# Patient Record
Sex: FEMALE | NOT HISPANIC OR LATINO | URBAN - METROPOLITAN AREA
[De-identification: names, ages, dates, MRNs, and addresses within clinical notes are randomized per-mention and may not be internally consistent; named-entity substitution may affect disease eponyms.]

---

## 2017-05-31 ENCOUNTER — IMPORTED ENCOUNTER (OUTPATIENT)
Dept: URBAN - METROPOLITAN AREA CLINIC 1 | Facility: CLINIC | Age: 27
End: 2017-05-31

## 2017-05-31 PROBLEM — H52.13: Noted: 2017-05-31

## 2017-05-31 PROCEDURE — 92014 COMPRE OPH EXAM EST PT 1/>: CPT

## 2017-05-31 PROCEDURE — 92015 DETERMINE REFRACTIVE STATE: CPT

## 2017-05-31 NOTE — PATIENT DISCUSSION
1. Myopia: Rx was given for correction if indicated and requested. 2.  GPC OD - ok to cont CTL wear OD. Recommend ATs BID OU 3. Allergic Conjunctivitis OU - may use OTC Zaditor BID OU PRN for itching 4. Congenital Cataract OS - ObserveReturn for an appointment in 1 year 40/cc with Dr. Lara Campbell.

## 2017-11-07 ENCOUNTER — OFFICE VISIT (OUTPATIENT)
Dept: ORTHOPEDIC SURGERY | Age: 27
End: 2017-11-07

## 2017-11-07 VITALS
WEIGHT: 155 LBS | OXYGEN SATURATION: 100 % | BODY MASS INDEX: 23.49 KG/M2 | RESPIRATION RATE: 16 BRPM | TEMPERATURE: 98.6 F | HEART RATE: 53 BPM | SYSTOLIC BLOOD PRESSURE: 138 MMHG | HEIGHT: 68 IN | DIASTOLIC BLOOD PRESSURE: 78 MMHG

## 2017-11-07 DIAGNOSIS — M25.562 LEFT KNEE PAIN, UNSPECIFIED CHRONICITY: ICD-10-CM

## 2017-11-07 DIAGNOSIS — S83.412A COMPLETE TEAR OF MCL OF KNEE, LEFT, INITIAL ENCOUNTER: ICD-10-CM

## 2017-11-07 DIAGNOSIS — S83.242A ACUTE MEDIAL MENISCUS TEAR, LEFT, INITIAL ENCOUNTER: ICD-10-CM

## 2017-11-07 DIAGNOSIS — S83.512A RUPTURE OF ANTERIOR CRUCIATE LIGAMENT OF LEFT KNEE, INITIAL ENCOUNTER: Primary | ICD-10-CM

## 2017-11-07 RX ORDER — MELOXICAM 15 MG/1
15 TABLET ORAL
Qty: 30 TAB | Refills: 0 | Status: CANCELLED | OUTPATIENT
Start: 2017-11-07

## 2017-11-07 RX ORDER — ETODOLAC 400 MG/1
400 TABLET, FILM COATED ORAL 2 TIMES DAILY WITH MEALS
Qty: 60 TAB | Refills: 0 | Status: SHIPPED | OUTPATIENT
Start: 2017-11-07 | End: 2019-11-05

## 2017-11-07 RX ORDER — HYDROCODONE BITARTRATE AND ACETAMINOPHEN 5; 325 MG/1; MG/1
1 TABLET ORAL
Qty: 40 TAB | Refills: 0 | Status: SHIPPED | OUTPATIENT
Start: 2017-11-07 | End: 2017-11-29

## 2017-11-07 NOTE — LETTER
NOTIFICATION RETURN TO WORK / SCHOOL 
 
11/7/2017 1:57 PM 
 
Ms. Lisa Parker [address] [address] To Whom It May Concern: 
 
Lisa Parker is currently under the care of [address] [place], [place] [address]antonio Aguilar. She will return to work on 11/8/2017 with the following restrictions: sedentary duty only. If there are questions or concerns please have the patient contact our office. Sincerely, Darnell Cornejo MD

## 2017-11-07 NOTE — PATIENT INSTRUCTIONS
Joint Pain: Care Instructions  Your Care Instructions    Many people have small aches and pains from overuse or injury to muscles and joints. Joint injuries often happen during sports or recreation, work tasks, or projects around the home. An overuse injury can happen when you put too much stress on a joint or when you do an activity that stresses the joint over and over, such as using the computer or rowing a boat. You can take action at home to help your muscles and joints get better. You should feel better in 1 to 2 weeks, but it can take 3 months or more to heal completely. Follow-up care is a key part of your treatment and safety. Be sure to make and go to all appointments, and call your doctor if you are having problems. It's also a good idea to know your test results and keep a list of the medicines you take. How can you care for yourself at home? · Do not put weight on the injured joint for at least a day or two. · For the first day or two after an injury, do not take hot showers or baths, and do not use hot packs. The heat could make swelling worse. · Put ice or a cold pack on the sore joint for 10 to 20 minutes at a time. Try to do this every 1 to 2 hours for the next 3 days (when you are awake) or until the swelling goes down. Put a thin cloth between the ice and your skin. · Wrap the injury in an elastic bandage. Do not wrap it too tightly because this can cause more swelling. · Prop up the sore joint on a pillow when you ice it or anytime you sit or lie down during the next 3 days. Try to keep it above the level of your heart. This will help reduce swelling. · Take an over-the-counter pain medicine, such as acetaminophen (Tylenol), ibuprofen (Advil, Motrin), or naproxen (Aleve). Read and follow all instructions on the label. · After 1 or 2 days of rest, begin moving the joint gently.  While the joint is still healing, you can begin to exercise using activities that do not strain or hurt the painful joint. When should you call for help? Call your doctor now or seek immediate medical care if:  ? · You have signs of infection, such as:  ¨ Increased pain, swelling, warmth, and redness. ¨ Red streaks leading from the joint. ¨ A fever. ? Watch closely for changes in your health, and be sure to contact your doctor if:  ? · Your movement or symptoms are not getting better after 1 to 2 weeks of home treatment. Where can you learn more? Go to http://andrea-agustin.info/. Enter P205 in the search box to learn more about \"Joint Pain: Care Instructions. \"  Current as of: March 21, 2017  Content Version: 11.4  © 4358-9062 [employer]. Care instructions adapted under license by [employer] (which disclaims liability or warranty for this information). If you have questions about a medical condition or this instruction, always ask your healthcare professional. [address] any warranty or liability for your use of this information.

## 2017-11-07 NOTE — PROGRESS NOTES
Ruma Trinidad  1990   Chief Complaint   Patient presents with    Knee Pain     Left        HISTORY OF PRESENT ILLNESS  Ruma Trinidad is a 33 y.o. female who presents today for evaluation of left knee pain. she rates her pain 2/10 today. Pain has been present since 10/4/17. She recalls she injured the knee while playing volleyball. Immediately felt pain but swelling began the next day. Patient describes the pain as aching, stabbing and throbbing that is Constant in nature. Symptoms are worse with straightening the knee, placing weight on it, rotation and is better with Rest. Associated symptoms include Swelling. Since problem started, it: is unchanged. Pain does not wake patient up at night. Has taken no medication for the problem. Ambulating with a crutch. She has been wearing a knee brace and has also brought a hinged knee brace with her. She is very active. Has tried following treatments: Injections:NO; Brace:NO; Therapy:NO; Cane/Crutch:YES       No Known Allergies     History reviewed. No pertinent past medical history. Social History     Social History    Marital status: UNKNOWN     Spouse name: N/A    Number of children: N/A    Years of education: N/A     Occupational History    Not on file. Social History Main Topics    Smoking status: Never Smoker    Smokeless tobacco: Never Used    Alcohol use Yes      Comment: socially    Drug use: No    Sexual activity: Not on file     Other Topics Concern    Not on file     Social History Narrative    No narrative on file      Past Surgical History:   Procedure Laterality Date    FOOT/TOES SURGERY PROC UNLISTED        History reviewed. No pertinent family history. Current Outpatient Prescriptions   Medication Sig    ibuprofen 100 mg tablet Take 100 mg by mouth every six (6) hours as needed for Pain.  HYDROcodone-acetaminophen (NORCO) 5-325 mg per tablet Take 1 Tab by mouth every six (6) hours as needed for Pain.  Max Daily Amount: 4 Tabs.  etodolac (LODINE) 400 mg tablet Take 400 mg by mouth two (2) times daily (with meals). No current facility-administered medications for this visit. REVIEW OF SYSTEM   Patient denies: Weight loss, Fever/Chills, HA, Visual changes, Fatigue, Chest pain, SOB, Abdominal pain, N/V/D/C, Blood in stool or urine, Edema. Pertinent positive as above in HPI. All others were negative    PHYSICAL EXAM:   Visit Vitals    /78    Pulse (!) 53    Temp 98.6 °F (37 °C) (Oral)    Resp 16    Ht 5' 8\" (1.727 m)    Wt 155 lb (70.3 kg)    SpO2 100%    BMI 23.57 kg/m2     The patient is a well-developed, well-nourished female   in no acute distress. The patient is alert and oriented times three. The patient is alert and oriented times three. Mood and affect are normal.  LYMPHATIC: lymph nodes are not enlarged and are within normal limits  SKIN: normal in color and non tender to palpation. There are no bruises or abrasions noted. NEUROLOGICAL: Motor sensory exam is within normal limits. Reflexes are equal bilaterally. There is normal sensation to pinprick and light touch  MUSCULOSKELETAL:  Examination Left knee   Skin Intact   Range of motion 30-80   Effusion +   Medial joint line tenderness +   Lateral joint line tenderness -   Tenderness Pes Bursa -   Tenderness insertion MCL +   Tenderness insertion LCL -   Jias -   Patella crepitus -   Patella grind -   Lachman +   Pivot shift +   Anterior drawer +   Posterior drawer -   Varus stress -   Valgus stress -   Neurovascular Intact   Calf Swelling and Tenderness to Palpation -   Rob's Test -   Hamstring Cord Tightness -        IMAGING:   XR of the left knee dated 11/7/17 was reviewed and read: normal      IMPRESSION:      ICD-10-CM ICD-9-CM    1. Rupture of anterior cruciate ligament of left knee, initial encounter S83.512A 844.2    2. Left knee pain, unspecified chronicity M25.562 719.46 AMB POC XRAY, KNEE; 1/2 VIEWS   3.  Acute medial meniscus tear, left, initial encounter S83.242A 836.0 AMB SUPPLY ORDER      HYDROcodone-acetaminophen (NORCO) 5-325 mg per tablet      MRI KNEE LT WO CONT      etodolac (LODINE) 400 mg tablet      REFERRAL TO PHYSICAL THERAPY   4. Complete tear of MCL of knee, left, initial encounter S83.412A 844.1         PLAN:  1. Concern for internal derangement of the left knee. Ice and elevate the knee. Work on gentle ROM. Continue to use the crutch, weightbearing as tolerated. Note provided to return to work sitting work only. She will wear the hinged knee brace that she brought with her. Risk factors include: n/a  2. No cortisone injection indicated today   3. No Physical/Occupational Therapy indicated today  4. Yes diagnostic test indicated today: MRI LEFT KNEE  5. Yes durable medical equipment indicated today: CRUTCHES  6. No referral indicated today   7. Yes medications indicated today: LODINE 200 MG BID, NORCO 5  8. No Narcotic indicated today. Patient given pain medication for short term acute pain relief. Goal is to treat patient according to above plan and to ultimately have patient off all pain medications once appropriate. If chronic pain management is required beyond what is expected for current orthopedic problem, will refer patient to pain management.  was reviewed and will be reviewed with every medication refill request.        RTC following completion of the MRI  Follow-up Disposition: Not on File    Scribed by Gaston Smith [address]) as dictated by Karl Cardenas MD    I, Dr. Karl Cardenas, confirm that all documentation is accurate.     Karl Cardenas M.D.   [facility]

## 2017-11-14 ENCOUNTER — OFFICE VISIT (OUTPATIENT)
Dept: ORTHOPEDIC SURGERY | Age: 27
End: 2017-11-14

## 2017-11-14 ENCOUNTER — HOSPITAL ENCOUNTER (OUTPATIENT)
Dept: PHYSICAL THERAPY | Age: 27
Discharge: HOME | End: 2017-11-14
Payer: OTHER GOVERNMENT

## 2017-11-14 VITALS
OXYGEN SATURATION: 100 % | DIASTOLIC BLOOD PRESSURE: 75 MMHG | SYSTOLIC BLOOD PRESSURE: 125 MMHG | HEIGHT: 68 IN | RESPIRATION RATE: 16 BRPM | HEART RATE: 58 BPM

## 2017-11-14 DIAGNOSIS — S83.512A RUPTURE OF ANTERIOR CRUCIATE LIGAMENT OF LEFT KNEE, INITIAL ENCOUNTER: Primary | ICD-10-CM

## 2017-11-14 PROCEDURE — 97161 PT EVAL LOW COMPLEX 20 MIN: CPT | Performed by: PHYSICAL THERAPIST

## 2017-11-14 RX ORDER — OXYCODONE AND ACETAMINOPHEN 5; 325 MG/1; MG/1
1-2 TABLET ORAL
Qty: 60 TAB | Refills: 0 | Status: SHIPPED | OUTPATIENT
Start: 2017-11-14 | End: 2019-11-05

## 2017-11-14 NOTE — PROGRESS NOTES
HISTORY AND PHYSICAL          Patient: Bruce Robbins                MRN: [id number]       SSN: [id number]  YOB: 1990          AGE: 33 y.o. SEX: female      Patient scheduled for:  Left Knee Arthroscopic Anterior Cruciate Ligament Reconstruction   Surgeon: Aileen Aguilar MD    ANESTHESIA TYPE:  General    HISTORY:     The patient was seen in the office today for a preoperative history and physical for an upcoming above listed surgery. The patient is a pleasant 33 y.o. female who has a history of left knee pain and MRI results. Initial injury 10/4/17 while playing volleyball. She rates her pain 1/10 today. She states she has been working on her mobility and is now able to straighten the knee. Associated symptoms include instability and swelling. Is still utilizing crutches and knee immobilizer. Due to the current findings, affected activity of daily living and continued pain and discomfort, surgical intervention is indicated. The alternatives, risks, and complications, including but not limited to infection, blood loss, need for blood transfusion, neurovascular damage, cristo-incisional numbness, subcutaneous hematoma, bone fracture, anesthetic complications, DVT, PE, death, RSD, postoperative stiffness and pain, possible surgical scar, delayed healing and nonhealing, reflexive sympathetic dystrophy, damage to blood vessels and nerves, need for more surgery, MI, and stroke,  failure of hardware, gait disturbances,have been discussed. The patient understands and wishes to proceed with surgery. PAST MEDICAL HISTORY:     No past medical history on file. CURRENT MEDICATIONS:     Current Outpatient Prescriptions   Medication Sig Dispense Refill    oxyCODONE-acetaminophen (PERCOCET) 5-325 mg per tablet Take 1-2 Tabs by mouth every four (4) hours as needed for Pain. Max Daily Amount: 12 Tabs.  Do not take until after surgery 60 Tab 0    ibuprofen 100 mg tablet Take 100 mg by mouth every six (6) hours as needed for Pain.  HYDROcodone-acetaminophen (NORCO) 5-325 mg per tablet Take 1 Tab by mouth every six (6) hours as needed for Pain. Max Daily Amount: 4 Tabs. 40 Tab 0    etodolac (LODINE) 400 mg tablet Take 400 mg by mouth two (2) times daily (with meals). 60 Tab 0       ALLERGIES:     No Known Allergies      SURGICAL HISTORY:     Past Surgical History:   Procedure Laterality Date    FOOT/TOES SURGERY PROC UNLISTED         SOCIAL HISTORY:     Social History     Social History    Marital status:      Spouse name: N/A    Number of children: N/A    Years of education: N/A     Social History Main Topics    Smoking status: Never Smoker    Smokeless tobacco: Never Used    Alcohol use Yes      Comment: socially    Drug use: No    Sexual activity: Not on file     Other Topics Concern    Not on file     Social History Narrative    No narrative on file       FAMILY HISTORY:     No family history on file. REVIEW OF SYSTEMS:     Negative for fevers, chills, chest pain, shortness of breath, weight loss, recent illness     General: Negative for fever and chills. No unexpected change in weight. Denies fatigue. No change in appetite. Skin: Negative for rash or itching. HEENT: Negative for congestion, sore throat, neck pain and neck stiffness. No change in vision or hearing. Hasn't noted any enlarged lymph nodes in the neck. Cardiovascular:  Negative for chest pain and palpitations. Has not noted pedal edema. Respiratory: Negative for cough, colds, sinus, hemoptysis, shortness of breath and wheezing. Gastrointestinal: Negative for nausea and vomiting, rectal bleeding, coffee ground emesis, abdominal pain, diarrhea and constipation. Genitourinary: Negative for dysuria, frequency urgency, or burning on micturition. No flank pain, no foul smelling urine, no difficulty with initiating urination. Hematological: Negative for bleeding or easy bruising.   Musculoskeletal: Negative  for arthralgias, back pain or neck pain. Neurological: Negative for dizziness, seizures or syncopal episodes. Denies headaches. Endocrine: Denies excessive thirst.  No heat/cold intolerance. Psychiatric: Negative for depression or insomnia. PHYSICAL EXAMINATION:     VITALS:   Visit Vitals    /75    Pulse (!) 58    Resp 16    Ht 5' 8\" (1.727 m)    SpO2 100%     GEN:  Well developed, well nourished 33 y.o. female in no acute distress. HEENT: Normocephalic and atraumatic. Eyes: Conjunctivae and EOM are normal.Pupils are equal, round, and reactive to light. External ear normal appearance, external nose normal appearing. Mouth/Throat: Oropharynx is clear and moist, able to handle oral secretions w/out difficulty, airway patent  NECK: Supple. Normal ROM, No lymphadenopathy. Trachea is midline. No bruising, swelling or deformity  RESP: Clear to auscultation bilaterally. No wheezes, rales, rhonchi. Normal effort and breath sounds. No respiratory distress  CARDIO: Normal rate, regular rhythm and normal heart sounds. No MGR. ABDOMEN: Soft, non-tender, non-distended, normoactive bowel sounds in all four quadrants. There is no tenderness. There is no rebound and no guarding.    BACK: No CVA or spinal tenderness  BREAST:  Deferred  PELVIC:    Deferred   RECTAL:  Deferred   :           Deferred  EXTREMITIES: EXAMINATION OF: left knee  Examination Left knee   Skin Intact   Range of motion 0-110   Effusion +   Medial joint line tenderness +   Lateral joint line tenderness -   Tenderness Pes Bursa -   Tenderness insertion MCL +   Tenderness insertion LCL -   Jias -   Patella crepitus -   Patella grind -   Lachman +   Pivot shift +   Anterior drawer +   Posterior drawer -   Varus stress -   Valgus stress -   Neurovascular Intact   Calf Swelling and Tenderness to Palpation -   Rob's Test -   Hamstring Cord Tightness -       NEUROVASCULAR: Sensation intact to light touch and strength grossly intact and symmetrical. No nystagmus. Positive distal pulses and capillary refill. DVT ASSESSMENT:  There is not  calf tenderness. No evidence of DVT seen on physical exam.  MOTOR: In tact  PSYCH: Alert an oriented to person, place and time. Mood, memory, affect, behavior and judgment normal       RADIOGRAPHS & DIAGNOSTIC STUDIES:     MRI/xray reveals :   MRI of the left knee dated 11/9/17 was reviewed and read:   IMPRESSION:  1. Complete anterior cruciate ligament tear with associated small minimally depressed subchondral impaction fracture involving the lateral femoral condyle, small non depressed posterior medial tibial plateau impaction fracture, a small posterior lateral tibial plateau bone contusion. 2. Grade 1 MCL sprain and mild medial meniscal capsular junction injury. No evidence of meniscus tear. 3. Minor articular cartilage degeneration of the labral tibial plateau. 4. Large joint effusion.     XR of the left knee dated 11/7/17 was reviewed and read: normal         ASSESSMENT:       Encounter Diagnosis   Name Primary?  Rupture of anterior cruciate ligament of left knee, initial encounter Yes       PLAN:     Again, the alternatives, risks, and complications, as well as expected outcome were discussed. The patient understands and agrees to proceed with Left Knee Arthroscopic Anterior Cruciate Ligament Reconstruction .  Patient given orders listed below:    Orders Placed This Encounter    oxyCODONE-acetaminophen (PERCOCET) 5-325 mg per tablet         Jhon Walton PA-C  11/14/2017  6:11 PM

## 2017-11-15 NOTE — PROGRESS NOTES
[address] [place] [address], [facility] - Phone: [phone]  Fax: [phone] [facility]ARE / [address] [facility]  Patient Name: Naty Watkins : 1990   Medical   Diagnosis: L ACL tear, Grade I MCL sprain Treatment Diagnosis: Left knee pain [M25.562]   Onset Date: 10/4/2017     Referral Source: [facility]): 11/15/2017   Prior Hospitalization: See medical history Provider #: [id number]   Prior Level of Function: Active and participating with ease in ADLs   Comorbidities: na   Medications: Verified on Patient Summary List   The Plan of Care and following information is based on the information from the initial evaluation.   ===========================================================================================  Assessment / key information:  Patient is a 32 y/o female presenting with subjective complaints of L knee pain, swelling, decreased proprioception, and instability that occurred while playing volleyball on 10/4/2017. Since sx onset, pt has been using crutches and an immobilizer for ambulation. On 2017, she received an MRI that revealed a complete ACL tear, grade I MCL sprain, and small subchondral impaction fracture of the lateral femoral condyle. Patient is scheduled for an ACL repair, hamstring graft on 2017. Currently, the patient is not experiencing pain. Current limitations:  unable to play volleyball, ambulate w/o an AD, complete ADLs. Objective findings:  L knee edema (joint line circumference 14.1 inches), L knee AROM/PROM -6-70 deg/-3-89 deg, L hip MMT 5/5 throughout. L quad set: fair, unable to test other LLE strength secondary to pain.  Pt instructed in HEP and advised to f/u in clinic for pre-op PT.  ===========================================================================================  Eval Complexity: History LOW Complexity : Zero comorbidities / personal factors that will impact the outcome / POC;  Examination  MEDIUM Complexity : 3 Standardized tests and measures addressing body structure, function, activity limitation and / or participation in recreation ; Presentation LOW Complexity : Stable, uncomplicated ;  Decision Making MEDIUM Complexity : FOTO score of 26-74; Overall Complexity LOW   Problem List: pain affecting function, decrease ROM, decrease strength, edema affecting function, impaired gait/ balance, decrease ADL/ functional abilitiies, decrease activity tolerance, decrease flexibility/ joint mobility and decrease transfer abilities   Treatment Plan may include any combination of the following: Therapeutic exercise, Neuromuscular re-education, Physical agent/modality, Gait/balance training, Manual therapy, Patient education, Functional mobility training and Stair training  Patient / Family readiness to learn indicated by: asking questions, trying to perform skills and interest  Persons(s) to be included in education: patient (P)  Barriers to Learning/Limitations: None  Measures taken: L knee AROM/PROM: -6-70/-3-89; joint line girth 14.1 inches   Patient Goal (s): To return to volleyball   Patient self reported health status: excellent  Rehabilitation Potential: excellent   Short Term Goals: To be accomplished in  2  weeks:  1. Patient will be independent with HEP and activity modifications. 2. Patient will be able to perform a LLE quad set in order to isolate quadriceps prior to surgery  3. Patient will be able to flex knee to 100 degrees in order to gain functional pre-op range.  Long Term Goals: To be accomplished in  4  weeks:  1. Patient will re re-evaluated post surgery. New goals will be adopted at that time.   Frequency / Duration:   Patient to be seen  1  times per week for 2  weeks:  Patient / Caregiver education and instruction: self care, activity modification, brace/ splint application and exercises  G-Codes (GP): na  Therapist Signature: ALFRED Buenrostro, DPT, CMT Date: 11/55/3552   Certification Period: na Time: 7:52 AM   ===========================================================================================  I certify that the above Physical Therapy Services are being furnished while the patient is under my care. I agree with the treatment plan and certify that this therapy is necessary. Physician Signature:        Date:       Time:     Please sign and return to [facility]. or you may fax the signed copy to [phone]. Thank you.

## 2017-11-15 NOTE — PROGRESS NOTES
PHYSICAL THERAPY - DAILY TREATMENT NOTE    Patient Name: Kevin Pang        Date: 11/15/2017  : 1990   yes Patient  Verified  Visit #:   1   of   3  Insurance: Payor:  / Plan: Ghassan Brush DEPENDENTS / Product Type:  /      In time: 6:08 Out time: 6:50   Total Treatment Time: 40     Medicare Time Tracking (below)   Total Timed Codes (min):  na 1:1 Treatment Time:  na     TREATMENT AREA =  Left knee pain [M25.562]    SUBJECTIVE  Pain Level (on 0 to 10 scale):  0  / 10   Medication Changes/New allergies or changes in medical history, any new surgeries or procedures? yes  If yes, update Summary List   Subjective Functional Status/Changes:  []  No changes reported     See IE          OBJECTIVE  10 min Therapeutic Exercise:  [x]  See flow sheet   Rationale:      increase ROM, increase strength and increase proprioception to improve the patients ability to perform ADLs      min Patient Education:  yes  Reviewed HEP   []  Progressed/Changed HEP based on: Other Objective/Functional Measures:    Pt arrived at session with immobilizer locked at 0-85 deg on L side and 0-120 deg on R side. PT adjusted immobilizer to lock out at 0-0 to help prevent knee buckling due to decrease L quad strength. No gait training perofrmed as pt did not bring in her crutches; informed pt of intention to gait train next session. Post Treatment Pain Level (on 0 to 10) scale:   0  / 10     ASSESSMENT  Assessment/Changes in Function:     See IE     []  See Progress Note/Recertification   Patient will continue to benefit from skilled PT services to modify and progress therapeutic interventions, address functional mobility deficits, address ROM deficits, address strength deficits, analyze and address soft tissue restrictions, analyze and cue movement patterns, analyze and modify body mechanics/ergonomics and instruct in home and community integration to attain remaining goals.    Progress toward goals / Updated goals:    See IE     PLAN  []  Upgrade activities as tolerated yes Continue plan of care   []  Discharge due to :    []  Other:      Therapist: Mervat Chappell    Date: 11/15/2017 Time: 8:56 AM     No future appointments.

## 2017-11-16 DIAGNOSIS — S83.512D SPRAIN OF ANTERIOR CRUCIATE LIGAMENT OF LEFT KNEE, SUBSEQUENT ENCOUNTER: Primary | ICD-10-CM

## 2017-11-17 DIAGNOSIS — S83.242A ACUTE MEDIAL MENISCUS TEAR, LEFT, INITIAL ENCOUNTER: ICD-10-CM

## 2017-11-21 ENCOUNTER — OFFICE VISIT (OUTPATIENT)
Dept: ORTHOPEDIC SURGERY | Age: 27
End: 2017-11-21

## 2017-11-21 VITALS
HEART RATE: 59 BPM | DIASTOLIC BLOOD PRESSURE: 62 MMHG | HEIGHT: 69 IN | RESPIRATION RATE: 16 BRPM | BODY MASS INDEX: 22.15 KG/M2 | SYSTOLIC BLOOD PRESSURE: 130 MMHG | OXYGEN SATURATION: 100 %

## 2017-11-21 DIAGNOSIS — S83.512A RUPTURE OF ANTERIOR CRUCIATE LIGAMENT OF LEFT KNEE, INITIAL ENCOUNTER: Primary | ICD-10-CM

## 2017-11-21 RX ORDER — TRIAMCINOLONE ACETONIDE 40 MG/ML
40 INJECTION, SUSPENSION INTRA-ARTICULAR; INTRAMUSCULAR ONCE
Qty: 1 ML | Refills: 0
Start: 2017-11-21 | End: 2017-11-21

## 2017-11-21 NOTE — PATIENT INSTRUCTIONS
Anterior Cruciate Ligament (ACL) Tear: Care Instructions  Your Care Instructions    The anterior cruciate ligament (ACL) is one of four knee ligaments that connect the upper leg bone with the lower leg bones. The ACL keeps your knee stable when your leg moves forward and backward. It also keeps the knee from bending sideways. A torn ACL often occurs during sports that require stop-and-go or twisting motions, such as soccer, football, basketball, and snow skiing. Treatment depends on how badly the ACL is torn. Not all injuries need surgery. Your doctor also will base your treatment on how unstable the knee is, whether other parts of your knee are injured, and how active you are. You may be able to wait for a while before deciding whether to have surgery. Follow-up care is a key part of your treatment and safety. Be sure to make and go to all appointments, and call your doctor if you are having problems. It's also a good idea to know your test results and keep a list of the medicines you take. How can you care for yourself at home? · Follow your doctor's directions for wearing a brace or an immobilizer, which limits movement of your knee. Wrapping your knee with an elastic bandage may help reduce or prevent swelling. · Use crutches as directed in the first few days after your injury if your doctor recommends them. · Rest your knee when possible. But try to flex your calf muscles often to help blood circulate in your legs. · Put ice or a cold pack on your knee for 10 to 20 minutes at a time. Try to do this every 1 to 2 hours during the next 3 days (when you are awake) or until the swelling goes down. Put a thin cloth between the ice and your skin. · Prop up your leg on a pillow when you ice it or anytime you sit or lie down during the next 3 days. Try to keep it above the level of your heart. This will help reduce swelling. · Take pain medicines exactly as directed.   ¨ If the doctor gave you a prescription medicine for pain, take it as prescribed. ¨ If you are not taking a prescription pain medicine, take an over-the-counter medicine to reduce pain and swelling. These include ibuprofen (Advil, Motrin) and naproxen (Aleve). Read and follow all instructions on the label. · Follow your doctor's or physical therapist's directions for strength exercises. Exercises to make your thigh muscles stronger and increase knee motion can help you get ready for a physical rehabilitation (rehab) program or surgery with a rehab program. Here are a few exercises you can do if your doctor says it is okay. ¨ Quad sets: Lie down on the floor or the bed with your injured leg straight. Fully extend your leg-there should be no or little bend in your knee. Tighten the thigh (quadriceps) of your injured leg for 10 seconds. Do not lift your heel up. Relax your quadriceps for 10 seconds. Repeat 8 to 12 times several times during the day. ¨ Straight-leg raises: Lie down on the floor or the bed with your injured leg flat and your uninjured leg bent so that the bottom of your foot is on the floor or bed. Tighten the quadriceps of your injured leg. Keeping your knee as straight as possible, lift your injured leg off the bed until it is about 18 inches above the bed or floor. Lower your leg back down and relax for 5 seconds. Do 3 sets of 8 to 12 repetitions. ¨ Heel slides: Lie down on the floor or the bed with your leg flat. Slowly begin to slide your heel toward your rear end (buttocks), keeping your heel on the floor. Your knee will begin to bend. Slide your heel and bend your knee until it becomes a little sore and you can feel a small amount of pressure inside your knee. Hold this position for 15 to 30 seconds. Slide your heel back down until your leg is straight on the floor. Relax for 10 seconds. Repeat 2 to 4 times several times during the day.   ¨ Side-lying leg lifts: Lie on your side with your legs straight and the injured leg on top. Keeping your leg straight, raise your injured leg up and backward about 6 inches. Lower the leg to the starting position. Do 3 sets of 20 repetitions, or if you tire quickly, 3 sets of 8 to 12 repetitions. When should you call for help? Call 911 anytime you think you may need emergency care. For example, call if:  ? · You have chest pain, are short of breath, or you cough up blood. ?Call your doctor now or seek immediate medical care if:  ? · You have new or worse pain. ? · Your foot is cool or pale or changes color. ? · You have tingling, weakness, or numbness in your toes. ? · Your cast or splint feels too tight. ? · You have signs of a blood clot in your leg (called a deep vein thrombosis), such as:  ¨ Pain in your calf, back of the knee, thigh, or groin. ¨ Redness or swelling in your leg. ? Watch closely for changes in your health, and be sure to contact your doctor if:  ? · You have a problem with your splint or cast.   ? · You do not get better as expected. Where can you learn more? Go to http://andrea-agustin.info/. Melissa Murcia in the search box to learn more about \"Anterior Cruciate Ligament (ACL) Tear: Care Instructions. \"  Current as of: March 21, 2017  Content Version: 11.4  © 2701-8344 [employer]. Care instructions adapted under license by [employer] (which disclaims liability or warranty for this information). If you have questions about a medical condition or this instruction, always ask your healthcare professional. [place] [address] any warranty or liability for your use of this information. Anterior Cruciate Ligament (ACL) Tear: Care Instructions  Your Care Instructions    The anterior cruciate ligament (ACL) is one of four knee ligaments that connect the upper leg bone with the lower leg bones. The ACL keeps your knee stable when your leg moves forward and backward.  It also keeps the knee from bending sideways. A torn ACL often occurs during sports that require stop-and-go or twisting motions, such as soccer, football, basketball, and snow skiing. Treatment depends on how badly the ACL is torn. Not all injuries need surgery. Your doctor also will base your treatment on how unstable the knee is, whether other parts of your knee are injured, and how active you are. You may be able to wait for a while before deciding whether to have surgery. Follow-up care is a key part of your treatment and safety. Be sure to make and go to all appointments, and call your doctor if you are having problems. It's also a good idea to know your test results and keep a list of the medicines you take. How can you care for yourself at home? · Follow your doctor's directions for wearing a brace or an immobilizer, which limits movement of your knee. Wrapping your knee with an elastic bandage may help reduce or prevent swelling. · Use crutches as directed in the first few days after your injury if your doctor recommends them. · Rest your knee when possible. But try to flex your calf muscles often to help blood circulate in your legs. · Put ice or a cold pack on your knee for 10 to 20 minutes at a time. Try to do this every 1 to 2 hours during the next 3 days (when you are awake) or until the swelling goes down. Put a thin cloth between the ice and your skin. · Prop up your leg on a pillow when you ice it or anytime you sit or lie down during the next 3 days. Try to keep it above the level of your heart. This will help reduce swelling. · Take pain medicines exactly as directed. ¨ If the doctor gave you a prescription medicine for pain, take it as prescribed. ¨ If you are not taking a prescription pain medicine, take an over-the-counter medicine to reduce pain and swelling. These include ibuprofen (Advil, Motrin) and naproxen (Aleve). Read and follow all instructions on the label.   · Follow your doctor's or physical therapist's directions for strength exercises. Exercises to make your thigh muscles stronger and increase knee motion can help you get ready for a physical rehabilitation (rehab) program or surgery with a rehab program. Here are a few exercises you can do if your doctor says it is okay. ¨ Quad sets: Lie down on the floor or the bed with your injured leg straight. Fully extend your leg-there should be no or little bend in your knee. Tighten the thigh (quadriceps) of your injured leg for 10 seconds. Do not lift your heel up. Relax your quadriceps for 10 seconds. Repeat 8 to 12 times several times during the day. ¨ Straight-leg raises: Lie down on the floor or the bed with your injured leg flat and your uninjured leg bent so that the bottom of your foot is on the floor or bed. Tighten the quadriceps of your injured leg. Keeping your knee as straight as possible, lift your injured leg off the bed until it is about 18 inches above the bed or floor. Lower your leg back down and relax for 5 seconds. Do 3 sets of 8 to 12 repetitions. ¨ Heel slides: Lie down on the floor or the bed with your leg flat. Slowly begin to slide your heel toward your rear end (buttocks), keeping your heel on the floor. Your knee will begin to bend. Slide your heel and bend your knee until it becomes a little sore and you can feel a small amount of pressure inside your knee. Hold this position for 15 to 30 seconds. Slide your heel back down until your leg is straight on the floor. Relax for 10 seconds. Repeat 2 to 4 times several times during the day. ¨ Side-lying leg lifts: Lie on your side with your legs straight and the injured leg on top. Keeping your leg straight, raise your injured leg up and backward about 6 inches. Lower the leg to the starting position. Do 3 sets of 20 repetitions, or if you tire quickly, 3 sets of 8 to 12 repetitions. When should you call for help? Call 911 anytime you think you may need emergency care.  For example, call if:  ? · You have chest pain, are short of breath, or you cough up blood. ?Call your doctor now or seek immediate medical care if:  ? · You have new or worse pain. ? · Your foot is cool or pale or changes color. ? · You have tingling, weakness, or numbness in your toes. ? · Your cast or splint feels too tight. ? · You have signs of a blood clot in your leg (called a deep vein thrombosis), such as:  ¨ Pain in your calf, back of the knee, thigh, or groin. ¨ Redness or swelling in your leg. ? Watch closely for changes in your health, and be sure to contact your doctor if:  ? · You have a problem with your splint or cast.   ? · You do not get better as expected. Where can you learn more? Go to http://andrea-agustin.info/. Gonzalo Perez in the search box to learn more about \"Anterior Cruciate Ligament (ACL) Tear: Care Instructions. \"  Current as of: March 21, 2017  Content Version: 11.4  © 4031-4311 [employer]. Care instructions adapted under license by [employer] (which disclaims liability or warranty for this information). If you have questions about a medical condition or this instruction, always ask your healthcare professional. [address] any warranty or liability for your use of this information.

## 2017-11-21 NOTE — PROGRESS NOTES
Kevin Pang  1990   Chief Complaint   Patient presents with    Knee Pain     left        HISTORY OF PRESENT ILLNESS  Kevin Pang is a 33 y.o. female who presents today for reevaluation of left knee pain. Initial injury 10/4/17 while playing volleyball. She rates her pain 1/10 today. She reports today complaining of significant discomfort while at PT. She is concerned she will not get to 120 degrees of flexion prior to surgery. She feels she is improving only slowly with PT. She feels her extension has continued to improve. Associated symptoms include instability and swelling. No longer utilizing crutches. Patient denies any fever, chills, chest pain, shortness of breath or calf pain. There are no new illness or injuries to report since last seen in the office. There are no changes to medications, allergies, family or social history. PHYSICAL EXAM:   Visit Vitals    /62 (BP 1 Location: Left arm, BP Patient Position: Sitting)    Pulse (!) 59    Resp 16    Ht 5' 9\" (1.753 m)    SpO2 100%    BMI 22.15 kg/m2     The patient is a well-developed, well-nourished female   in no acute distress. The patient is alert and oriented times three. The patient is alert and oriented times three. Mood and affect are normal.  LYMPHATIC: lymph nodes are not enlarged and are within normal limits  SKIN: normal in color and non tender to palpation. There are no bruises or abrasions noted. NEUROLOGICAL: Motor sensory exam is within normal limits. Reflexes are equal bilaterally.  There is normal sensation to pinprick and light touch  MUSCULOSKELETAL:  Examination Left knee   Skin Intact   Range of motion 0-110   Effusion +   Medial joint line tenderness +   Lateral joint line tenderness -   Tenderness Pes Bursa -   Tenderness insertion MCL +   Tenderness insertion LCL -   Jias -   Patella crepitus -   Patella grind -   Lachman +   Pivot shift +   Anterior drawer +   Posterior drawer -   Varus stress -   Valgus stress -   Neurovascular Intact   Calf Swelling and Tenderness to Palpation -   Rob's Test -   Hamstring Cord Tightness -     PROCEDURE: After sterile prep and under ultrasound examination, left knee was aspirated. 20 cc of bloody tinge fluid were obtained. The fluid was discarded. After sterile prep, 4 cc of Xylocaine and 1 cc of Kenalog were injected into the left knee. [facility]  OFFICE PROCEDURE PROGRESS NOTE        Chart reviewed for the following:  Christina Gold MD, have reviewed the History, Physical and updated the Allergic reactions for [address] performed immediately prior to start of procedure:  Christina Gold MD, have performed the following reviews on Jennifer Skinner prior to the start of the procedure:            * Patient was identified by name and date of birth   * Agreement on procedure being performed was verified  * Risks and Benefits explained to the patient  * Procedure site verified and marked as necessary  * Patient was positioned for comfort  * Consent was signed and verified     Time: 4:41 PM     Date of procedure: 11/21/2017    Procedure performed by:  Araseli Hays MD    Provider assisted by: (see medication administration)    How tolerated by patient: tolerated the procedure well with no complications    Comments: none       IMAGING:   MRI of the left knee dated 11/9/17 was reviewed and read:   IMPRESSION:  1. Complete anterior cruciate ligament tear with associated small minimally depressed subchondral impaction fracture involving the lateral femoral condyle, small non depressed posterior medial tibial plateau impaction fracture, a small posterior lateral tibial plateau bone contusion. 2. Grade 1 MCL sprain and mild medial meniscal capsular junction injury. No evidence of meniscus tear. 3. Minor articular cartilage degeneration of the labral tibial plateau.   4. Large joint effusion. XR of the left knee dated 11/7/17 was reviewed and read: normal    IMPRESSION:      ICD-10-CM ICD-9-CM    1. Rupture of anterior cruciate ligament of left knee, initial encounter S83.512A 844.2 US GUIDE INJ/ASP/ARTHRO LG JNT/BURSA      TRIAMCINOLONE ACETONIDE INJ      triamcinolone acetonide (KENALOG) 40 mg/mL injection        PLAN:   1. I feel the patient's left knee swelling has improved but patient is still experiencing a significant amount of discomfort. 2. Yes aspiration and cortisone injection indicated today   3. Continue with Physical Therapy indicated today  4. No diagnostic test indicated today  5. No durable medical equipment indicated today  6. No referral indicated today   7. No medications indicated today  8. No Narcotic indicated today     RTC surgery   Follow-up Disposition: Not on File    Scribed by Celio Franklin [address]) as dictated by Pilar Young MD    I, Dr. Pialr Young, confirm that all documentation is accurate.     Pilar Young M.D.   [facility]

## 2017-11-28 ENCOUNTER — ANESTHESIA EVENT (OUTPATIENT)
Dept: SURGERY | Age: 27
End: 2017-11-28
Payer: OTHER GOVERNMENT

## 2017-11-29 ENCOUNTER — ANESTHESIA (OUTPATIENT)
Dept: SURGERY | Age: 27
End: 2017-11-29
Payer: OTHER GOVERNMENT

## 2017-11-29 ENCOUNTER — HOSPITAL ENCOUNTER (OUTPATIENT)
Age: 27
Setting detail: OUTPATIENT SURGERY
Discharge: HOME | End: 2017-11-29
Attending: ORTHOPAEDIC SURGERY | Admitting: ORTHOPAEDIC SURGERY
Payer: OTHER GOVERNMENT

## 2017-11-29 VITALS
WEIGHT: 157 LBS | HEIGHT: 68 IN | RESPIRATION RATE: 20 BRPM | OXYGEN SATURATION: 100 % | HEART RATE: 84 BPM | DIASTOLIC BLOOD PRESSURE: 72 MMHG | SYSTOLIC BLOOD PRESSURE: 117 MMHG | BODY MASS INDEX: 23.79 KG/M2 | TEMPERATURE: 97.6 F

## 2017-11-29 LAB — HCG UR QL: NEGATIVE

## 2017-11-29 PROCEDURE — 77030019605: Performed by: ORTHOPAEDIC SURGERY

## 2017-11-29 PROCEDURE — 74011250636 HC RX REV CODE- 250/636: Performed by: NURSE ANESTHETIST, CERTIFIED REGISTERED

## 2017-11-29 PROCEDURE — 77030002922 HC SUT FBRWRE ARTH -B: Performed by: ORTHOPAEDIC SURGERY

## 2017-11-29 PROCEDURE — 77030012422 HC DRN WND COVD -A: Performed by: ORTHOPAEDIC SURGERY

## 2017-11-29 PROCEDURE — 74011250636 HC RX REV CODE- 250/636

## 2017-11-29 PROCEDURE — 81025 URINE PREGNANCY TEST: CPT

## 2017-11-29 PROCEDURE — 77030020754 HC CUF TRNQT 2BLA STRY -B: Performed by: ORTHOPAEDIC SURGERY

## 2017-11-29 PROCEDURE — C1713 ANCHOR/SCREW BN/BN,TIS/BN: HCPCS | Performed by: ORTHOPAEDIC SURGERY

## 2017-11-29 PROCEDURE — 77030018673: Performed by: ORTHOPAEDIC SURGERY

## 2017-11-29 PROCEDURE — L1830 KO IMMOB CANVAS LONG PRE OTS: HCPCS | Performed by: ORTHOPAEDIC SURGERY

## 2017-11-29 PROCEDURE — 76210000016 HC OR PH I REC 1 TO 1.5 HR: Performed by: ORTHOPAEDIC SURGERY

## 2017-11-29 PROCEDURE — 64447 NJX AA&/STRD FEMORAL NRV IMG: CPT | Performed by: ANESTHESIOLOGY

## 2017-11-29 PROCEDURE — 74011250636 HC RX REV CODE- 250/636: Performed by: ORTHOPAEDIC SURGERY

## 2017-11-29 PROCEDURE — 77030011265 HC ELECTRD BLD HEX COVD -A: Performed by: ORTHOPAEDIC SURGERY

## 2017-11-29 PROCEDURE — 77030026861 HC KT ACL APRFX DISP ZIMM -E: Performed by: ORTHOPAEDIC SURGERY

## 2017-11-29 PROCEDURE — 77030031139 HC SUT VCRL2 J&J -A: Performed by: ORTHOPAEDIC SURGERY

## 2017-11-29 PROCEDURE — 77030020782 HC GWN BAIR PAWS FLX 3M -B: Performed by: ORTHOPAEDIC SURGERY

## 2017-11-29 PROCEDURE — 77030002933 HC SUT MCRYL J&J -A: Performed by: ORTHOPAEDIC SURGERY

## 2017-11-29 PROCEDURE — 77030008509 HC TBNG IN/OUT FLO LEMV -B: Performed by: ORTHOPAEDIC SURGERY

## 2017-11-29 PROCEDURE — 74011000250 HC RX REV CODE- 250

## 2017-11-29 PROCEDURE — L1810 KO ELASTIC WITH JOINTS: HCPCS | Performed by: ORTHOPAEDIC SURGERY

## 2017-11-29 PROCEDURE — 77030011640 HC PAD GRND REM COVD -A: Performed by: ORTHOPAEDIC SURGERY

## 2017-11-29 PROCEDURE — 76210000021 HC REC RM PH II 0.5 TO 1 HR: Performed by: ORTHOPAEDIC SURGERY

## 2017-11-29 PROCEDURE — 74011250637 HC RX REV CODE- 250/637: Performed by: NURSE ANESTHETIST, CERTIFIED REGISTERED

## 2017-11-29 PROCEDURE — 76942 ECHO GUIDE FOR BIOPSY: CPT | Performed by: ANESTHESIOLOGY

## 2017-11-29 PROCEDURE — 77030018836 HC SOL IRR NACL ICUM -A: Performed by: ORTHOPAEDIC SURGERY

## 2017-11-29 PROCEDURE — 77030032490 HC SLV COMPR SCD KNE COVD -B: Performed by: ORTHOPAEDIC SURGERY

## 2017-11-29 PROCEDURE — 77030008574 HC TBNG SUC IRR STRY -B: Performed by: ORTHOPAEDIC SURGERY

## 2017-11-29 PROCEDURE — 76010000132 HC OR TIME 2.5 TO 3 HR: Performed by: ORTHOPAEDIC SURGERY

## 2017-11-29 PROCEDURE — 74011000250 HC RX REV CODE- 250: Performed by: NURSE ANESTHETIST, CERTIFIED REGISTERED

## 2017-11-29 PROCEDURE — 76060000036 HC ANESTHESIA 2.5 TO 3 HR: Performed by: ORTHOPAEDIC SURGERY

## 2017-11-29 PROCEDURE — 77030003666 HC NDL SPINAL BD -A: Performed by: ORTHOPAEDIC SURGERY

## 2017-11-29 PROCEDURE — 77030002919 HC SUT FBRTAPE ARTH -B: Performed by: ORTHOPAEDIC SURGERY

## 2017-11-29 PROCEDURE — 77030020268 HC MISC GENERAL SUPPLY: Performed by: ORTHOPAEDIC SURGERY

## 2017-11-29 DEVICE — BUTTON FIX FOR ACL RECON W/ TI AND UHMWPE TIGHTROPE: Type: IMPLANTABLE DEVICE | Status: FUNCTIONAL

## 2017-11-29 RX ORDER — ASPIRIN 325 MG
325 TABLET ORAL DAILY
Qty: 14 TAB | Refills: 0 | Status: SHIPPED | OUTPATIENT
Start: 2017-11-30 | End: 2019-11-05

## 2017-11-29 RX ORDER — DEXAMETHASONE SODIUM PHOSPHATE 4 MG/ML
INJECTION, SOLUTION INTRA-ARTICULAR; INTRALESIONAL; INTRAMUSCULAR; INTRAVENOUS; SOFT TISSUE AS NEEDED
Status: DISCONTINUED | OUTPATIENT
Start: 2017-11-29 | End: 2017-11-29 | Stop reason: HOSPADM

## 2017-11-29 RX ORDER — FENTANYL CITRATE 50 UG/ML
INJECTION, SOLUTION INTRAMUSCULAR; INTRAVENOUS AS NEEDED
Status: DISCONTINUED | OUTPATIENT
Start: 2017-11-29 | End: 2017-11-29 | Stop reason: HOSPADM

## 2017-11-29 RX ORDER — SODIUM CHLORIDE, SODIUM LACTATE, POTASSIUM CHLORIDE, CALCIUM CHLORIDE 600; 310; 30; 20 MG/100ML; MG/100ML; MG/100ML; MG/100ML
75 INJECTION, SOLUTION INTRAVENOUS CONTINUOUS
Status: DISCONTINUED | OUTPATIENT
Start: 2017-11-29 | End: 2017-11-29 | Stop reason: HOSPADM

## 2017-11-29 RX ORDER — MIDAZOLAM HYDROCHLORIDE 1 MG/ML
INJECTION, SOLUTION INTRAMUSCULAR; INTRAVENOUS AS NEEDED
Status: DISCONTINUED | OUTPATIENT
Start: 2017-11-29 | End: 2017-11-29 | Stop reason: HOSPADM

## 2017-11-29 RX ORDER — PROMETHAZINE HYDROCHLORIDE 25 MG/ML
25 INJECTION, SOLUTION INTRAMUSCULAR; INTRAVENOUS
Status: COMPLETED | OUTPATIENT
Start: 2017-11-29 | End: 2017-11-29

## 2017-11-29 RX ORDER — ROPIVACAINE HYDROCHLORIDE 5 MG/ML
30 INJECTION, SOLUTION EPIDURAL; INFILTRATION; PERINEURAL
Status: COMPLETED | OUTPATIENT
Start: 2017-11-29 | End: 2017-11-29

## 2017-11-29 RX ORDER — KETOROLAC TROMETHAMINE 30 MG/ML
30 INJECTION, SOLUTION INTRAMUSCULAR; INTRAVENOUS
Status: COMPLETED | OUTPATIENT
Start: 2017-11-29 | End: 2017-11-29

## 2017-11-29 RX ORDER — FAMOTIDINE 20 MG/1
20 TABLET, FILM COATED ORAL ONCE
Status: COMPLETED | OUTPATIENT
Start: 2017-11-29 | End: 2017-11-29

## 2017-11-29 RX ORDER — LIDOCAINE HYDROCHLORIDE 20 MG/ML
INJECTION, SOLUTION EPIDURAL; INFILTRATION; INTRACAUDAL; PERINEURAL AS NEEDED
Status: DISCONTINUED | OUTPATIENT
Start: 2017-11-29 | End: 2017-11-29 | Stop reason: HOSPADM

## 2017-11-29 RX ORDER — HYDROMORPHONE HYDROCHLORIDE 2 MG/ML
0.5 INJECTION, SOLUTION INTRAMUSCULAR; INTRAVENOUS; SUBCUTANEOUS
Status: DISCONTINUED | OUTPATIENT
Start: 2017-11-29 | End: 2017-11-29 | Stop reason: HOSPADM

## 2017-11-29 RX ORDER — SODIUM CHLORIDE 0.9 % (FLUSH) 0.9 %
5-10 SYRINGE (ML) INJECTION AS NEEDED
Status: DISCONTINUED | OUTPATIENT
Start: 2017-11-29 | End: 2017-11-29 | Stop reason: HOSPADM

## 2017-11-29 RX ORDER — HYDROMORPHONE HYDROCHLORIDE 2 MG/ML
INJECTION, SOLUTION INTRAMUSCULAR; INTRAVENOUS; SUBCUTANEOUS
Status: COMPLETED
Start: 2017-11-29 | End: 2017-11-29

## 2017-11-29 RX ORDER — SODIUM CHLORIDE 0.9 % (FLUSH) 0.9 %
5-10 SYRINGE (ML) INJECTION EVERY 8 HOURS
Status: DISCONTINUED | OUTPATIENT
Start: 2017-11-29 | End: 2017-11-29 | Stop reason: HOSPADM

## 2017-11-29 RX ORDER — CEFAZOLIN SODIUM 2 G/50ML
2 SOLUTION INTRAVENOUS ONCE
Status: COMPLETED | OUTPATIENT
Start: 2017-11-29 | End: 2017-11-29

## 2017-11-29 RX ORDER — ONDANSETRON 2 MG/ML
4 INJECTION INTRAMUSCULAR; INTRAVENOUS ONCE
Status: COMPLETED | OUTPATIENT
Start: 2017-11-29 | End: 2017-11-29

## 2017-11-29 RX ORDER — FENTANYL CITRATE 50 UG/ML
100 INJECTION, SOLUTION INTRAMUSCULAR; INTRAVENOUS ONCE
Status: COMPLETED | OUTPATIENT
Start: 2017-11-29 | End: 2017-11-29

## 2017-11-29 RX ORDER — LIDOCAINE HYDROCHLORIDE 10 MG/ML
3 INJECTION, SOLUTION EPIDURAL; INFILTRATION; INTRACAUDAL; PERINEURAL ONCE
Status: COMPLETED | OUTPATIENT
Start: 2017-11-29 | End: 2017-11-29

## 2017-11-29 RX ORDER — ONDANSETRON 2 MG/ML
INJECTION INTRAMUSCULAR; INTRAVENOUS
Status: DISCONTINUED
Start: 2017-11-29 | End: 2017-11-29 | Stop reason: HOSPADM

## 2017-11-29 RX ORDER — SUCCINYLCHOLINE CHLORIDE 20 MG/ML
INJECTION INTRAMUSCULAR; INTRAVENOUS AS NEEDED
Status: DISCONTINUED | OUTPATIENT
Start: 2017-11-29 | End: 2017-11-29 | Stop reason: HOSPADM

## 2017-11-29 RX ORDER — PROPOFOL 10 MG/ML
INJECTION, EMULSION INTRAVENOUS AS NEEDED
Status: DISCONTINUED | OUTPATIENT
Start: 2017-11-29 | End: 2017-11-29 | Stop reason: HOSPADM

## 2017-11-29 RX ORDER — MIDAZOLAM HYDROCHLORIDE 1 MG/ML
2 INJECTION, SOLUTION INTRAMUSCULAR; INTRAVENOUS ONCE
Status: COMPLETED | OUTPATIENT
Start: 2017-11-29 | End: 2017-11-29

## 2017-11-29 RX ORDER — ONDANSETRON 2 MG/ML
INJECTION INTRAMUSCULAR; INTRAVENOUS AS NEEDED
Status: DISCONTINUED | OUTPATIENT
Start: 2017-11-29 | End: 2017-11-29 | Stop reason: HOSPADM

## 2017-11-29 RX ADMIN — SODIUM CHLORIDE, SODIUM LACTATE, POTASSIUM CHLORIDE, AND CALCIUM CHLORIDE 75 ML/HR: 600; 310; 30; 20 INJECTION, SOLUTION INTRAVENOUS at 06:18

## 2017-11-29 RX ADMIN — HYDROMORPHONE HYDROCHLORIDE 0.5 MG: 2 INJECTION, SOLUTION INTRAMUSCULAR; INTRAVENOUS; SUBCUTANEOUS at 10:26

## 2017-11-29 RX ADMIN — ONDANSETRON 4 MG: 2 INJECTION INTRAMUSCULAR; INTRAVENOUS at 10:38

## 2017-11-29 RX ADMIN — ONDANSETRON 4 MG: 2 INJECTION INTRAMUSCULAR; INTRAVENOUS at 07:53

## 2017-11-29 RX ADMIN — HYDROMORPHONE HYDROCHLORIDE 0.5 MG: 2 INJECTION, SOLUTION INTRAMUSCULAR; INTRAVENOUS; SUBCUTANEOUS at 10:46

## 2017-11-29 RX ADMIN — FENTANYL CITRATE 50 MCG: 50 INJECTION, SOLUTION INTRAMUSCULAR; INTRAVENOUS at 07:45

## 2017-11-29 RX ADMIN — LIDOCAINE HYDROCHLORIDE 60 MG: 20 INJECTION, SOLUTION EPIDURAL; INFILTRATION; INTRACAUDAL; PERINEURAL at 07:45

## 2017-11-29 RX ADMIN — FENTANYL CITRATE 50 MCG: 50 INJECTION, SOLUTION INTRAMUSCULAR; INTRAVENOUS at 08:11

## 2017-11-29 RX ADMIN — HYDROMORPHONE HYDROCHLORIDE 0.5 MG: 2 INJECTION, SOLUTION INTRAMUSCULAR; INTRAVENOUS; SUBCUTANEOUS at 11:19

## 2017-11-29 RX ADMIN — HYDROMORPHONE HYDROCHLORIDE 0.5 MG: 2 INJECTION, SOLUTION INTRAMUSCULAR; INTRAVENOUS; SUBCUTANEOUS at 10:56

## 2017-11-29 RX ADMIN — SUCCINYLCHOLINE CHLORIDE 100 MG: 20 INJECTION INTRAMUSCULAR; INTRAVENOUS at 07:46

## 2017-11-29 RX ADMIN — FENTANYL CITRATE 100 MCG: 50 INJECTION INTRAMUSCULAR; INTRAVENOUS at 07:11

## 2017-11-29 RX ADMIN — ROPIVACAINE HYDROCHLORIDE 150 MG: 5 INJECTION, SOLUTION EPIDURAL; INFILTRATION; PERINEURAL at 07:27

## 2017-11-29 RX ADMIN — MIDAZOLAM HYDROCHLORIDE 2 MG: 1 INJECTION, SOLUTION INTRAMUSCULAR; INTRAVENOUS at 07:40

## 2017-11-29 RX ADMIN — FENTANYL CITRATE 50 MCG: 50 INJECTION, SOLUTION INTRAMUSCULAR; INTRAVENOUS at 08:45

## 2017-11-29 RX ADMIN — CEFAZOLIN SODIUM 2 G: 2 SOLUTION INTRAVENOUS at 07:40

## 2017-11-29 RX ADMIN — HYDROMORPHONE HYDROCHLORIDE 0.5 MG: 2 INJECTION, SOLUTION INTRAMUSCULAR; INTRAVENOUS; SUBCUTANEOUS at 10:36

## 2017-11-29 RX ADMIN — FAMOTIDINE 20 MG: 20 TABLET, FILM COATED ORAL at 06:25

## 2017-11-29 RX ADMIN — LIDOCAINE HYDROCHLORIDE 3 ML: 10 INJECTION, SOLUTION EPIDURAL; INFILTRATION; INTRACAUDAL; PERINEURAL at 07:14

## 2017-11-29 RX ADMIN — PROMETHAZINE HYDROCHLORIDE 25 MG: 25 INJECTION INTRAMUSCULAR; INTRAVENOUS at 10:55

## 2017-11-29 RX ADMIN — FENTANYL CITRATE 50 MCG: 50 INJECTION, SOLUTION INTRAMUSCULAR; INTRAVENOUS at 09:30

## 2017-11-29 RX ADMIN — PROPOFOL 150 MG: 10 INJECTION, EMULSION INTRAVENOUS at 07:45

## 2017-11-29 RX ADMIN — MIDAZOLAM HYDROCHLORIDE 2 MG: 1 INJECTION, SOLUTION INTRAMUSCULAR; INTRAVENOUS at 07:11

## 2017-11-29 RX ADMIN — DEXAMETHASONE SODIUM PHOSPHATE 8 MG: 4 INJECTION, SOLUTION INTRA-ARTICULAR; INTRALESIONAL; INTRAMUSCULAR; INTRAVENOUS; SOFT TISSUE at 07:53

## 2017-11-29 RX ADMIN — KETOROLAC TROMETHAMINE 30 MG: 30 INJECTION, SOLUTION INTRAMUSCULAR at 10:28

## 2017-11-29 NOTE — IP AVS SNAPSHOT
[facility] 
 
 
 [facility] 61 [facility] Patient: Eulogio Austin MRN: [id number] CBS:2/2/5600 About your hospitalization You were admitted on:  November 29, 2017 You last received care in the:  [facility] PHASE 2 RECOVERY You were discharged on:  November 29, 2017 Why you were hospitalized Your primary diagnosis was:  Not on File Things You Need To Do (next 8 weeks) Follow up with Susan Covarrubias MD  
  
Phone:  [phone] Where:  [address] [facility], [address], [facility] [address] Schedule an appointment with Ivonne Reynoso MD as soon as possible for a visit in 1 week(s) Phone:  [phone] Where:  [address] [facility], [address], [address] [facility] [address] [facility] [address] Thursday Nov 30, 2017  
pt eval with Jose López PT at 12:00 PM  
Where:  [address] [facility] Wednesday Dec 06, 2017 HISTORY AND PHYSICAL with DENISA Segovia at  4:00 PM  
Where:  [address] [facility]) Discharge Orders None A check tess indicates which time of day the medication should be taken. My Medications STOP taking these medications HYDROcodone-acetaminophen 5-325 mg per tablet Commonly known as:  [address] these medications as instructed Instructions Each Dose to Equal  
 Morning Noon Evening Bedtime  
 aspirin 325 mg tablet Commonly known as:  ASPIRIN Start taking on:  11/30/2017 Your last dose was: Your next dose is: Take 1 Tab by mouth daily. 325 mg  
    
   
   
   
  
 etodolac 400 mg tablet Commonly known as:  LODINE Your last dose was: Your next dose is: Take 400 mg by mouth two (2) times daily (with meals). 400 mg  
    
   
   
   
  
 oxyCODONE-acetaminophen 5-325 mg per tablet Commonly known as:  PERCOCET Your last dose was: Your next dose is: Take 1-2 Tabs by mouth every four (4) hours as needed for Pain. Max Daily Amount: 12 Tabs. Do not take until after surgery 1-2 Tab Where to Get Your Medications Information on where to get these meds will be given to you by the nurse or doctor. ! Ask your nurse or doctor about these medications  
  aspirin 325 mg tablet Discharge Instructions Dr. Destiny Leary Postoperative Information Make sure you take 1 325mg Aspirin a day starting the day after your surgery for 2 straight weeks. This is to prevent blood clots. You will be given a prescription for pain medication. It may be taken every 4-6 hours as needed for the first 4-5 days. A soft bandage was placed on your knee to soak up blood and fluid. You may take the bandage off the day after surgery, carefully cleaning the incision sites with peroxide. Band-Aids should be used for the first 4-5 days over each incision. You also will be put in a knee brace. The brace should be unlocked to allow full range of motion. Please use this brace as instructed. There are 2 small incisions and 1 larger incision in your knee that may be sore and develop bruising over the next several days. You should expect swelling in the area. You may elevate your leg and apply an icepack on top of the dressing to help minimize the swelling. Deep massage to the lower leg may also be utilized. It is safe to take a shower two days after surgery. You may begin toe touch weight bearing with 2 crutches. Specific exercises to improve your mobility and strength will be taught to you during physical therapy. Make sure that you attend physical therapy the first business day after your surgery.  
 
 
If you have a high temperature, unexpected pain, redness or swelling, or any drainage around your knee area, please call my office immediately. Please make an appointment to return to my office in one week. Dr. Meliza Arroyo office number [phone] DISCHARGE SUMMARY from Nurse PATIENT INSTRUCTIONS: 
 
 
F-face looks uneven A-arms unable to move or move unevenly S-speech slurred or non-existent T-time-call 911 as soon as signs and symptoms begin-DO NOT go Back to bed or wait to see if you get better-TIME IS BRAIN. Warning Signs of HEART ATTACK Call 911 if you have these symptoms: 
? Chest discomfort. Most heart attacks involve discomfort in the center of the chest that lasts more than a few minutes, or that goes away and comes back. It can feel like uncomfortable pressure, squeezing, fullness, or pain. ? Discomfort in other areas of the upper body. Symptoms can include pain or discomfort in one or both arms, the back, neck, jaw, or stomach. ? Shortness of breath with or without chest discomfort. ? Other signs may include breaking out in a cold sweat, nausea, or lightheadedness. Don't wait more than five minutes to call [address]! Fast action can save your life. Calling 911 is almost always the fastest way to get lifesaving treatment. Emergency Medical Services staff can begin treatment when they arrive  up to an hour sooner than if someone gets to the hospital by car. The discharge information has been reviewed with the patient and parents. The patient and parents verbalized understanding. Discharge medications reviewed with the patient and parents and appropriate educational materials and side effects teaching were provided. ___________________________________________________________________________________________________________________________________ Introducing [facility]!    
 [employer] introduces [employer] patient portal. Now you can access parts of your medical record, email your doctor's office, and request medication refills online. 1. In your internet browser, go to https://"[email or web address]. Rem[employer]ly/"[employer] 2. Click on the First Time User? Click Here link in the Sign In box. You will see the New Member Sign Up page. 3. Enter your [employer] Access Code exactly as it appears below. You will not need to use this code after youve completed the sign-up process. If you do not sign up before the expiration date, you must request a new code. · [employer] Access Code: [email or web address] Expires: 2/5/2018  2:40 PM 
 
4. Enter the last four digits of your Social Security Number (xxxx) and Date of Birth (mm/dd/yyyy) as indicated and click Submit. You will be taken to the next sign-up page. 5. Create a [employer] ID. This will be your [employer] login ID and cannot be changed, so think of one that is secure and easy to remember. 6. Create a [employer] password. You can change your password at any time. 7. Enter your Password Reset Question and Answer. This can be used at a later time if you forget your password. 8. Enter your e-mail address. You will receive e-mail notification when new information is available in [address] 9. Click Sign Up. You can now view and download portions of your medical record. 10. Click the Download Summary menu link to download a portable copy of your medical information. If you have questions, please visit the Frequently Asked Questions section of the [employer] website. Remember, [employer] is NOT to be used for urgent needs. For medical emergencies, dial 911. Now available from your iPhone and Android! Providers Seen During Your Hospitalization Provider Specialty Primary office phone Elba Brown MD Orthopedic Surgery [phone] Your Primary Care Physician (PCP) Primary Care Physician Office Phone Office Fax [place] [phone] You are allergic to the following Allergen Reactions Pecan Nut Anaphylaxis Hives [address] Anaphylaxis Hives Recent Documentation Height Weight BMI OB Status Smoking Status 1.727 m 71.2 kg 23.87 kg/m2 IUD Never Smoker Emergency Contacts Name Discharge Info Relation Home Work Mobile Saul Quevedo CAREGIVER [3] Parent [1]   [phone] Pamela KIRAN CAREGIVER [4] Friend [5]   [phone] Patient Belongings The following personal items are in your possession at time of discharge: 
  Dental Appliances: None  Visual Aid: Glasses          Jewelry: Necklace, Ring  Clothing: Socks, Footwear, Undergarments, Pants, Shirt    Other Valuables: Eyeglasses, Brace Please provide this summary of care documentation to your next provider. Signatures-by signing, you are acknowledging that this After Visit Summary has been reviewed with you and you have received a copy. Patient Signature:  ____________________________________________________________ Date:  ____________________________________________________________  
  
Alli Johner Provider Signature:  ____________________________________________________________ Date:  ____________________________________________________________

## 2017-11-29 NOTE — ANESTHESIA PROCEDURE NOTES
Peripheral Block    Start time: 11/29/2017 7:10 AM  End time: 11/29/2017 7:20 AM  Performed by: Dominique Chisholm by: Michelle Iverson       Pre-procedure: Indications: at surgeon's request, post-op pain management and procedure for pain    Preanesthetic Checklist: patient identified, risks and benefits discussed, site marked, timeout performed, anesthesia consent given and patient being monitored      Block Type:   Block Type:  Femoral single shot  Laterality:  Left  Monitoring:  Standard ASA monitoring, continuous pulse ox, frequent vital sign checks, oxygen, heart rate and responsive to questions  Injection Technique:  Single shot  Procedures: ultrasound guided and nerve stimulator    Patient Position: supine  Prep: chlorhexidine    Location:  Upper thigh  Needle Type:  Stimuplex  Needle Gauge:  21 G  Needle Localization:  Ultrasound guidance and nerve stimulator  Medication Injected:  0.5%  ropivacaine  Volume (mL):  25    Assessment:  Number of attempts:  2  Injection Assessment:  No paresthesia, incremental injection every 5 mL, ultrasound image on chart, no intravascular symptoms, negative aspiration for blood and local visualized surrounding nerve on ultrasound  Patient tolerance:  Patient tolerated the procedure well with no immediate complications  Location:  PREOP HOLDING    Patient given 2 mg IV Versed and 100 mcg IV Fentanyl for sedation.     11/29/2017     7:33 AM     Danika Wright MD

## 2017-11-29 NOTE — H&P (VIEW-ONLY)
Naty Delay  1990   Chief Complaint   Patient presents with    Knee Pain     left        HISTORY OF PRESENT ILLNESS  Naty Watkins is a 33 y.o. female who presents today for reevaluation of left knee pain and MRI results. Initial injury 10/4/17 while playing volleyball. She rates her pain 1/10 today. She states she has been working on her mobility and is now able to straighten the knee. Associated symptoms include instability and swelling. Is still utilizing crutches and knee immobilizer. Patient denies any fever, chills, chest pain, shortness of breath or calf pain. There are no new illness or injuries to report since last seen in the office. There are no changes to medications, allergies, family or social history. PHYSICAL EXAM:   Visit Vitals    /75    Pulse (!) 58    Resp 16    Ht 5' 8\" (1.727 m)    SpO2 100%     The patient is a well-developed, well-nourished female   in no acute distress. The patient is alert and oriented times three. The patient is alert and oriented times three. Mood and affect are normal.  LYMPHATIC: lymph nodes are not enlarged and are within normal limits  SKIN: normal in color and non tender to palpation. There are no bruises or abrasions noted. NEUROLOGICAL: Motor sensory exam is within normal limits. Reflexes are equal bilaterally.  There is normal sensation to pinprick and light touch  MUSCULOSKELETAL:  Examination Left knee   Skin Intact   Range of motion 0-110   Effusion +   Medial joint line tenderness +   Lateral joint line tenderness -   Tenderness Pes Bursa -   Tenderness insertion MCL +   Tenderness insertion LCL -   Jias -   Patella crepitus -   Patella grind -   Lachman +   Pivot shift +   Anterior drawer +   Posterior drawer -   Varus stress -   Valgus stress -   Neurovascular Intact   Calf Swelling and Tenderness to Palpation -   Rob's Test -   Hamstring Cord Tightness -        IMAGING:   MRI of the left knee dated 11/9/17 was reviewed and read:   IMPRESSION:  1. Complete anterior cruciate ligament tear with associated small minimally depressed subchondral impaction fracture involving the lateral femoral condyle, small non depressed posterior medial tibial plateau impaction fracture, a small posterior lateral tibial plateau bone contusion. 2. Grade 1 MCL sprain and mild medial meniscal capsular junction injury. No evidence of meniscus tear. 3. Minor articular cartilage degeneration of the labral tibial plateau. 4. Large joint effusion. XR of the left knee dated 11/7/17 was reviewed and read: normal    IMPRESSION:      ICD-10-CM ICD-9-CM    1. Rupture of anterior cruciate ligament of left knee, initial encounter S83.512A 844.2         PLAN:   1. I discussed the results of the MRI and the treatment options with the patient. Instructed to continue to work on mobility and quad tone prior to surgery. I discussed the risks and benefits and potential adverse outcomes of both operative vs non operative treatment of left knee ACL tear with the patient. Patient wishes to proceed with left knee arthroscopic ACL reconstruction. Risks of operative intervention include but not limited to bleeding, infection, deep vein thrombosis, pulmonary embolism, death, limb length discrepancy, reflexive sympathetic dystrophy, fat embolism syndrome,damage to blood vessels and nerves, malunion, non-union, delayed union, failure of hardware, post traumatic arthritis, stroke, heart attack, and death. Patient understands that infection may arise and may require numerous surgeries. History and physical exam scheduled for a later date. Risk factors include: n/a  2. No cortisone injection indicated today   3. No Physical/Occupational Therapy indicated today  4. No diagnostic test indicated today  5. No durable medical equipment indicated today  6. No referral indicated today   7. No medications indicated today  8.  No Narcotic indicated today     RTC H&P  Follow-up Disposition: Not on File    Scribed by Sven [facility]) as dictated by Patricio Pulido MD    I, Dr. Patricio Pulido, confirm that all documentation is accurate.     Patricio Pulido M.D.   [facility]

## 2017-11-29 NOTE — PERIOP NOTES
I have reviewed discharge instructions with the patient and parents. The patient and parnets verbalized understanding. Patient armband removed and shredded.

## 2017-11-29 NOTE — BRIEF OP NOTE
BRIEF OPERATIVE NOTE    Date of Procedure: 11/29/2017   Preoperative Diagnosis: Rupture of anterior cruciate ligament of left knee, initial encounter [S83.512A]  Postoperative Diagnosis: Rupture of anterior cruciate ligament of left knee, initial encounter [S83.512A]    Procedure(s):  LEFT KNEE ARTHROSCOPIC ANTERIOR CRUCIATE LIGAMENT RECONSTRUCTION/ARTHREX/FGL GRAFT  Surgeon(s) and Role:     * Ramy Interiano MD - Primary         Assistant Staff:   Anu Cleaning PA-C    Surgical Staff:  Circ-1: Ligia Downey RN  Circ-Relief: Kim Corbin  Scrub Tech-1: Vandana Plascencia  Surg Asst-1: Vinny Gould  Event Time In   Incision Start 6755   Incision Close      Anesthesia: General   Estimated Blood Loss: <15mL  Specimens: * No specimens in log *    Complications: none patient to recovery room stable  Implants: * No implants in log *

## 2017-11-29 NOTE — PROGRESS NOTES
conducted a pre-surgery visit with Brianne Michel, who is a 33 y.o.,female. The  provided the following Interventions:  Initiated a relationship of care and support. Plan:  Chaplains will continue to follow and will provide pastoral care on an as needed/requested basis.  recommends bedside caregivers page  on duty if patient shows signs of acute spiritual or emotional distress.     [facility] Certified [address] [place] [address]   [phone]

## 2017-11-29 NOTE — ANESTHESIA PREPROCEDURE EVALUATION
Anesthetic History   No history of anesthetic complications            Review of Systems / Medical History  Patient summary reviewed and pertinent labs reviewed    Pulmonary              Pertinent negatives: No smoker     Neuro/Psych   Within defined limits           Cardiovascular  Within defined limits                     GI/Hepatic/Renal  Within defined limits              Endo/Other  Within defined limits           Other Findings   Comments:   Risk Factors for Postoperative nausea/vomiting:       History of postoperative nausea/vomiting? NO       Female? YES       Motion sickness? NO       Intended opioid administration for postoperative analgesia? NO      Smoking Abstinence  Current Smoker? NO  Elective Surgery? YES  Seen preoperatively by anesthesiologist or proxy prior to day of surgery? YES  Pt abstained from smoking 24 hours prior to anesthesia?  N/A           Physical Exam    Airway  Mallampati: II  TM Distance: 4 - 6 cm  Neck ROM: normal range of motion   Mouth opening: Normal     Cardiovascular  Regular rate and rhythm,  S1 and S2 normal,  no murmur, click, rub, or gallop             Dental  No notable dental hx       Pulmonary  Breath sounds clear to auscultation               Abdominal  Abdominal exam normal       Other Findings            Anesthetic Plan    ASA: 1  Anesthesia type: general and regional - femoral single shot      Post-op pain plan if not by surgeon: peripheral nerve block single    Induction: Intravenous  Anesthetic plan and risks discussed with: Patient

## 2017-11-29 NOTE — DISCHARGE INSTRUCTIONS
Dr. Anika Gordon ACL   Postoperative Information    Make sure you take 1 325mg Aspirin a day starting the day after your surgery for 2 straight weeks. This is to prevent blood clots. You will be given a prescription for pain medication. It may be taken every 4-6 hours as needed for the first 4-5 days. A soft bandage was placed on your knee to soak up blood and fluid. You may take the bandage off the day after surgery, carefully cleaning the incision sites with peroxide. Band-Aids should be used for the first 4-5 days over each incision. You also will be put in a knee brace. The brace should be unlocked to allow full range of motion. Please use this brace as instructed. There are 2 small incisions and 1 larger incision in your knee that may be sore and develop bruising over the next several days. You should expect swelling in the area. You may elevate your leg and apply an icepack on top of the dressing to help minimize the swelling. Deep massage to the lower leg may also be utilized. It is safe to take a shower two days after surgery. You may begin toe touch weight bearing with 2 crutches. Specific exercises to improve your mobility and strength will be taught to you during physical therapy. Make sure that you attend physical therapy the first business day after your surgery. If you have a high temperature, unexpected pain, redness or swelling, or any drainage around your knee area, please call my office immediately. Please make an appointment to return to my office in one week.     Dr. Anika Gordon office number [phone]    DISCHARGE SUMMARY from Nurse    PATIENT INSTRUCTIONS:    After general anesthesia or intravenous sedation, for 24 hours or while taking prescription Narcotics:  · Limit your activities  · Do not drive and operate hazardous machinery  · Do not make important personal or business decisions  · Do  not drink alcoholic beverages  · If you have not urinated within 8 hours after discharge, please contact your surgeon on call. Report the following to your surgeon:  · Excessive pain, swelling, redness or odor of or around the surgical area  · Temperature over 100.5  · Nausea and vomiting lasting longer than 4 hours or if unable to take medications  · Any signs of decreased circulation or nerve impairment to extremity: change in color, persistent  numbness, tingling, coldness or increase pain  · Any questions  *  Please give a list of your current medications to your Primary Care Provider. *  Please update this list whenever your medications are discontinued, doses are      changed, or new medications (including over-the-counter products) are added. *  Please carry medication information at all times in case of emergency situations. These are general instructions for a healthy lifestyle:    No smoking/ No tobacco products/ Avoid exposure to second hand smoke  Surgeon General's Warning:  Quitting smoking now greatly reduces serious risk to your health. Obesity, smoking, and sedentary lifestyle greatly increases your risk for illness    A healthy diet, regular physical exercise & weight monitoring are important for maintaining a healthy lifestyle    You may be retaining fluid if you have a history of heart failure or if you experience any of the following symptoms:  Weight gain of 3 pounds or more overnight or 5 pounds in a week, increased swelling in our hands or feet or shortness of breath while lying flat in bed. Please call your doctor as soon as you notice any of these symptoms; do not wait until your next office visit. Recognize signs and symptoms of STROKE:    F-face looks uneven    A-arms unable to move or move unevenly    S-speech slurred or non-existent    T-time-call 911 as soon as signs and symptoms begin-DO NOT go       Back to bed or wait to see if you get better-TIME IS BRAIN.     Warning Signs of HEART ATTACK     Call 911 if you have these symptoms:   Chest discomfort. Most heart attacks involve discomfort in the center of the chest that lasts more than a few minutes, or that goes away and comes back. It can feel like uncomfortable pressure, squeezing, fullness, or pain.  Discomfort in other areas of the upper body. Symptoms can include pain or discomfort in one or both arms, the back, neck, jaw, or stomach.  Shortness of breath with or without chest discomfort.  Other signs may include breaking out in a cold sweat, nausea, or lightheadedness. Don't wait more than five minutes to call 911 - MINUTES MATTER! Fast action can save your life. Calling 911 is almost always the fastest way to get lifesaving treatment. Emergency Medical Services staff can begin treatment when they arrive -- up to an hour sooner than if someone gets to the hospital by car. The discharge information has been reviewed with the patient and parents. The patient and parents verbalized understanding. Discharge medications reviewed with the patient and parents and appropriate educational materials and side effects teaching were provided.   ___________________________________________________________________________________________________________________________________

## 2017-11-29 NOTE — ANESTHESIA POSTPROCEDURE EVALUATION
Post-Anesthesia Evaluation and Assessment    Patient: Danford Epley MRN: [id number]  SSN: [id number]    YOB: 1990  Age: 33 y.o. Sex: female       Cardiovascular Function/Vital Signs  Visit Vitals    /72 (BP 1 Location: Left arm, BP Patient Position: At rest)    Pulse 84    Temp 36.4 °C (97.6 °F)    Resp 20    Ht 5' 8\" (1.727 m)    Wt 71.2 kg (157 lb)    SpO2 100%    BMI 23.87 kg/m2       Patient is status post general anesthesia for Procedure(s):  LEFT KNEE ARTHROSCOPIC ANTERIOR CRUCIATE LIGAMENT RECONSTRUCTION/ARTHREX/FGL GRAFT. Nausea/Vomiting: None    Postoperative hydration reviewed and adequate. Pain:  Pain Scale 1: Numeric (0 - 10) (11/29/17 1139)  Pain Intensity 1: 5 (11/29/17 1139)   Managed    Neurological Status:   Neuro (WDL): Within Defined Limits (11/29/17 1024)   At baseline    Mental Status and Level of Consciousness: Arousable    Pulmonary Status:   O2 Device: Room air (11/29/17 1139)   Adequate oxygenation and airway patent    Complications related to anesthesia: None    Post-anesthesia assessment completed.  No concerns    Signed By: Oswald Carlton MD     November 29, 2017

## 2017-11-29 NOTE — INTERVAL H&P NOTE
H&P Update:  Sandrita Dawkins was seen and examined. History and physical has been reviewed. The patient has been examined. There have been no significant clinical changes since the completion of the originally dated History and Physical.  Patient identified by surgeon; surgical site was confirmed by patient and surgeon.     Signed By: DENISA Jones     November 29, 2017 7:23 AM

## 2017-11-30 ENCOUNTER — HOSPITAL ENCOUNTER (OUTPATIENT)
Dept: PHYSICAL THERAPY | Age: 27
Discharge: HOME | End: 2017-11-30
Payer: OTHER GOVERNMENT

## 2017-11-30 DIAGNOSIS — S83.512A RUPTURE OF ANTERIOR CRUCIATE LIGAMENT OF LEFT KNEE, INITIAL ENCOUNTER: Primary | ICD-10-CM

## 2017-11-30 PROCEDURE — 97016 VASOPNEUMATIC DEVICE THERAPY: CPT

## 2017-11-30 PROCEDURE — 97140 MANUAL THERAPY 1/> REGIONS: CPT

## 2017-11-30 PROCEDURE — 97162 PT EVAL MOD COMPLEX 30 MIN: CPT

## 2017-11-30 PROCEDURE — 97110 THERAPEUTIC EXERCISES: CPT

## 2017-11-30 NOTE — PROGRESS NOTES
PHYSICAL THERAPY - DAILY TREATMENT NOTE    Patient Name: Wilma Swanson        Date: 2017  : 1990   yes Patient  Verified  Visit #:     Insurance: Payor:  / Plan: Ghassan Brush DEPENDENTS / Product Type:  /      In time: 12:05 Out time: 1:05   Total Treatment Time: 60     Medicare Time Tracking (below)   Total Timed Codes (min):  na 1:1 Treatment Time:  na     TREATMENT AREA =  Left knee pain [M25.562]    SUBJECTIVE  Pain Level (on 0 to 10 scale):  7  / 10   Medication Changes/New allergies or changes in medical history, any new1 surgeries or procedures?    no  If yes, update Summary List   Subjective Functional Status/Changes:  []  No changes reported     See POC          OBJECTIVE  Modalities Rationale:     decrease edema, decrease inflammation and decrease pain to improve patient's ability to ambulate   min [] Estim, type/location:                                      []  att     []  unatt     []  w/US     []  w/ice    []  w/heat    min []  Mechanical Traction: type/lbs                   []  pro   []  sup   []  int   []  cont    []  before manual    []  after manual    min []  Ultrasound, settings/location:      min []  Iontophoresis w/ dexamethasone, location:                                               []  take home patch       []  in clinic    min []  Ice     []  Heat    location/position:    10 min [x]  Vasopneumatic Device, press/temp: LP/LT to L knee in supine    min []  Other:    [x] Skin assessment post-treatment (if applicable):    [x]  intact    []  redness- no adverse reaction     []redness - adverse reaction:        25 min Therapeutic Exercise:  [x]  See flow sheet   Rationale:      increase ROM, increase strength and improve coordination to improve the patients ability to ambulate     10 min Manual Therapy: PROM knee flexion/ext, patella mob, dressing change   Rationale:      decrease pain, increase ROM, increase tissue extensibility and decrease trigger points to improve patient's ability to ambulate     min Patient Education:  yes  Reviewed HEP   []  Progressed/Changed HEP based on: Other Objective/Functional Measures:    Pt demo I w/ HEP  Reported feeling light headed following tx session, pt was assisted to seat in waiting room and was given glass of water, as well as piece of candy, after which she stated she felt better. Pt was assisted by her parents out of clinic. Post Treatment Pain Level (on 0 to 10) scale:   8  / 10     ASSESSMENT  Assessment/Changes in Function:     See POC     []  See Progress Note/Recertification   Patient will continue to benefit from skilled PT services to modify and progress therapeutic interventions, address functional mobility deficits, address ROM deficits, address strength deficits, analyze and address soft tissue restrictions, analyze and cue movement patterns, analyze and modify body mechanics/ergonomics, address imbalance/dizziness and instruct in home and community integration to attain remaining goals.    Progress toward goals / Updated goals:    See POC     PLAN  []  Upgrade activities as tolerated yes Continue plan of care   []  Discharge due to :    []  Other:      Therapist: Lauryn Larry PT    Date: 11/30/2017 Time: 1:39 PM     Future Appointments  Date Time Provider Candido Mccarty   12/4/2017 3:30 PM Wesly Silver PT [facility]   12/6/2017 11:00 AM Lauryn Larry, PT [facility]   12/6/2017 4:00 PM DENISA Russ   12/8/2017 10:30 AM Jase Wheeler, PT [facility]   12/11/2017 4:30 PM Wesly Silver PT [facility]   12/15/2017 11:00 AM Shanel Pizano, PT [facility]   12/18/2017 5:30 PM Wesly Silver PT [facility]   12/20/2017 4:00 PM Corinna Michael [address] [place] [address]   12/21/2017 4:30 PM Irvin Tang, PT [facility]   12/26/2017 5:00 PM Irvin Tang, PT [facility]   12/28/2017 5:30 PM JACQUELINE June [facility]   1/2/2018 5:00 PM Rebekah Ranks, PT [facility]   1/4/2018 5:00 PM [address], [address] [place] [address]   1/9/2018 5:30 PM Rebekah Ranks, PT [facility]   1/11/2018 5:30 PM Rebekah Ranks, PT [facility]   1/16/2018 5:30 PM Rebekah Ranks, PT [facility]   1/18/2018 5:30 PM Rebekah Ranks, PT [facility]   1/23/2018 5:30 PM Rebekah Ranks, PT [facility]   1/25/2018 5:30 PM Rebekah Ranks, PT [facility]

## 2017-11-30 NOTE — OP NOTES
[address]    Name:  Key Edmond  MR#:  [id number]  :  1990  Account #:  [de-identified]  Date of Adm:  2017  Date of Surgery:  2017      PREOPERATIVE DIAGNOSIS: Anterior cruciate ligament tear, left  knee. POSTOPERATIVE DIAGNOSES: Anterior cruciate ligament tear, left  knee. PROCEDURES PERFORMED: Anterior cruciate ligament  reconstruction and hamstring graft, left knee. SURGEON: Amie Watts MD.    ASSISTANT: DENISA Rodriguez.    ESTIMATED BLOOD LOSS: Minimal.    COMPLICATIONS: None. SPECIMENS REMOVED: None. FINDINGS: ACL tear. ANESTHESIA: General.    COMPLICATIONS:    BRIEF HISTORY: The patient has had significant problems since an  injury sustaining an ACL tear, was consented for surgery after having  discussed at length possible risks and complications of surgery  including infection, bleeding, recurrence, pain, among other possible  problems. PROCEDURE IN DETAIL: The patient was taken to the operating  room, induced under general endotracheal by the anesthesia staff,  placed in a standard arthroscopy tierney. The left leg was prepped with  DuraPrep solution and draped as a free sterile field. An anterolateral  portal was used as our arthroscopy portal; anteromedial portal was the  work portal. Portals were made with an 11 blade followed by blunt  trocars. Once the arthroscope was in place, the knee was  systematically evaluated and suprapatellar pouch, patellofemoral joint,  no changes were noted. In the medial compartment, no evidence of  tears. Articular surface was pristine. Anterior cruciate ligament was  torn. Lateral compartment some chondral changes in the weightbearing  surface of the femoral condyle with no significant depression. The  meniscus was probed and visualized, thought to be no tears present. The anterior cruciate ligament was debrided.  A very narrow notch was  found, for which a notchplasty performed with a 5 mm bur. Attention was then placed anteromedial aspect of the knee through a 3  cm incision. Semitendinosus was dissected from surrounding tissues  and with a tendon stripper removed, taken to the back table and an 8.5  graft was fashioned with 4 bundles with a tightrope on each end. Using  a retrograde FlipCutter guide at the 4 o'clock position and through a  stab incision laterally, the fluid color was introduced and an 8.5 x 22  tunnel pulled on the femoral side, 8.5 x 30 on the tibial side. The graft  was 65 mm in length, 8.5 in thickness. The passing sutures were  placed in both the femoral and tibial tunnels using an [employer] system  all inside system. The femoral side was then passed through the  anteromedial portal, 20 mm of graft placed in the tunnel, followed  retrograde into the tibial tunnel with 20 mm of graft placed and then we  were able to tension it with tightropes on each end, effecting a very  stable graft free of impingement on range of motion. The subcutaneous  tissues were closed with 2-0 Vicryl and 4-0 Monocryl. Sterile dressings  were applied. The patient tolerated the procedure well and was taken  to recovery room without problems.         MD ANGELITO Briceño / Guillermina Donis  D:  11/29/2017   12:21  T:  11/29/2017   19:17  Job #:  [id number]

## 2017-11-30 NOTE — PROGRESS NOTES
[address] [place] [address], [address] - Phone: [phone]  Fax: [phone] OF CARE / [facility]  Patient Name: Tere Barrios : 1990   Medical   Diagnosis: S/p L ACL-R w/ HS autograft Treatment Diagnosis: Left knee pain [M25.562]   Onset Date: DOS: 17     Referral Source: Lashay Galleg[facility]): 2017   Prior Hospitalization: See medical history Provider #: [id number]   Prior Level of Function: Participate in recreational sports w/o difficulty   Comorbidities: None reported   Medications: Verified on Patient Summary List   The Plan of Care and following information is based on the information from the initial evaluation.   ===========================================================================================  Assessment / key information:  Pt is a 34 y/o F who presents to PT w/ c/o L knee pain s/p L ACL-R w/ ST autograft (DOS 17). Pt reports initial injury occurred 17 while playing volleyball. Pt was evaluated pre-operatively in our clinic and her pmh is familiar to us. Pt c/o pain ranging 1-10/10 located primarily anterior knee. Pt reports attempting quad sets yesterday evening and this AM and has been icing frequently. Pt denies red flags. Pt presents to evaluation w/ TROM braced donned and unlocked, ambulating w/ B axillary crutches NWB through the L LE. Surgical portals show no signs of infection and no drainage noted. Mild effusion to L knee (+ 2 cm mid patella, +1.5 2\" above, -3.5 4\" above). L knee AROM 6-87 deg; PROM 4-92. Fair- quad set L, unable to perform SLR w/o assist or lag. Pt demo dec gastroc extensibility, ttp to HS, and dec inf patellar mobility. (-)Rob's sign. FOTO score 1/100. Pt educated on dx, prognosis, POC, HEP, and post-op restrictions.  Pt may benefit from PT to address problem list below to enable pt improved functional mobility.  ===========================================================================================  Eval Complexity: History LOW Complexity : Zero comorbidities / personal factors that will impact the outcome / POC;  Examination  HIGH Complexity : 4+ Standardized tests and measures addressing body structure, function, activity limitation and / or participation in recreation ; Presentation MEDIUM Complexity : Evolving with changing characteristics ; Decision Making HIGH Complexity : FOTO score of 1- 25 ; Overall Complexity LOW   Problem List: pain affecting function, decrease ROM, decrease strength, edema affecting function, impaired gait/ balance, decrease ADL/ functional abilitiies, decrease activity tolerance, decrease flexibility/ joint mobility and decrease transfer abilities   Treatment Plan may include any combination of the following: Therapeutic exercise, Therapeutic activities, Neuromuscular re-education, Physical agent/modality, Gait/balance training, Manual therapy, Patient education, Self Care training, Functional mobility training, Home safety training and Stair training  Patient / Family readiness to learn indicated by: asking questions, trying to perform skills and interest  Persons(s) to be included in education: patient (P)  Barriers to Learning/Limitations: None  Measures taken:    Patient Goal (s): \"full return to activity\"   Patient self reported health status: excellent  Rehabilitation Potential: excellent   Short Term Goals: To be accomplished in  3  weeks:  1. Pt will be I and compliant with HEP for self management of sx  2. Pt will demo SLR flex 10 w/o extensor lag to progress to ambulation out of brace  3. Improve L knee AROM 0-115 deg to improve gait symmetry and transfer ability   Long Term Goals: To be accomplished in  6  weeks:  1. Pt to demo 10 reps 4\" lateral tap downs proper technique, no pain to improve reciprocal stair negotiation  2.  Improve FOTO score to >/= 50/100 to indicate improved function  3. Pt will demo L knee AROM = to R knee to assist w/ return to PLOF  Frequency / Duration:   Patient to be seen  3 times per week for 6  weeks:  Patient / Caregiver education and instruction: self care, activity modification, brace/ splint application and exercises  G-Codes (GP): elizabeth  Therapist Signature: Jl Rivera PT Date: 00/87/9007   Certification Period: na Time: 1:45 PM   ===========================================================================================  I certify that the above Physical Therapy Services are being furnished while the patient is under my care. I agree with the treatment plan and certify that this therapy is necessary. Physician Signature:        Date:       Time:     Please sign and return to [facility]. or you may fax the signed copy to [phone]. Thank you.

## 2017-12-04 ENCOUNTER — TELEPHONE (OUTPATIENT)
Dept: ORTHOPEDIC SURGERY | Age: 27
End: 2017-12-04

## 2017-12-04 ENCOUNTER — HOSPITAL ENCOUNTER (OUTPATIENT)
Dept: PHYSICAL THERAPY | Age: 27
Discharge: HOME | End: 2017-12-04
Payer: OTHER GOVERNMENT

## 2017-12-04 PROCEDURE — 97110 THERAPEUTIC EXERCISES: CPT

## 2017-12-04 PROCEDURE — 97014 ELECTRIC STIMULATION THERAPY: CPT

## 2017-12-04 PROCEDURE — 97140 MANUAL THERAPY 1/> REGIONS: CPT

## 2017-12-04 PROCEDURE — 97016 VASOPNEUMATIC DEVICE THERAPY: CPT

## 2017-12-04 RX ORDER — ONDANSETRON 4 MG/1
4 TABLET, FILM COATED ORAL
Qty: 30 TAB | Refills: 0 | Status: SHIPPED | OUTPATIENT
Start: 2017-12-04 | End: 2019-11-05

## 2017-12-04 NOTE — TELEPHONE ENCOUNTER
I put in an order for zofran. Please ask her if she would like to try a different pain medication such as norco. To see if she still has nausea. If so I can put in the order for that as well.

## 2017-12-04 NOTE — PROGRESS NOTES
PHYSICAL THERAPY - DAILY TREATMENT NOTE    Patient Name: Milly Clark        Date: 2017  : 1990   YES Patient  Verified  Visit #:      18  Insurance: Payor:  / Plan: Ghassan Brush DEPENDENTS / Product Type:  /      In time: 3:38 Out time: 4:48   Total Treatment Time: 70     TREATMENT AREA =  Left knee pain [M25.562]    SUBJECTIVE  Pain Level (on 0 to 10 scale):  0  / 10   Medication Changes/New allergies or changes in medical history, any new surgeries or procedures?     NO    If yes, update Summary List   Subjective Functional Status/Changes:  []  No changes reported     Trying to put foot down some with WB but it feels awkward          OBJECTIVE  Modalities Rationale:     decrease edema, decrease inflammation, decrease pain, increase tissue extensibility and increase muscle contraction/control to improve patient's ability to weight bear, ADLs  10 pre min [x] Estim, type/location: [facility] 10on/30off, 75 pps, VM/VL                                     []  att     [x]  unatt     []  w/US     [x]  w/ice    []  w/heat    min []  Mechanical Traction: type/lbs                   []  pro   []  sup   []  int   []  cont    []  before manual    []  after manual    min []  Ultrasound, settings/location:      min []  Iontophoresis w/ dexamethasone, location:                                               []  take home patch       []  in clinic    min []  Ice     []  Heat    location/position:    10 post min [x]  Vasopneumatic Device, press/temp: Low pressure, 46deg    min []  Other:    [x] Skin assessment post-treatment (if applicable):    []  intact    [x]  redness- no adverse reaction     []redness - adverse reaction:        10 Min Manual Therapy: Pat mobs, PROM ext/flex   Rationale:      decrease pain, increase ROM and increase tissue extensibility to improve patient's ability to amb with crutches and PWB    40 min Therapeutic Exercise:  [x]  See flow sheet   Rationale: increase ROM and increase strength to improve the patients ability to amb with crutches PWB      min Patient Education:  YES  Reviewed HEP   []  Progressed/Changed HEP based on: Other Objective/Functional Measures:    AAROM 0-108 with no resistance, Pt reported ant knee tightness  Added wt shift with scale to 80lb with good beckie  amb with crutches trying to initiate knee flexion however limted by resistance with brace       Post Treatment Pain Level (on 0 to 10) scale:   0  / 10     ASSESSMENT  Assessment/Changes in Function:     Improved knee ROM     []  See Progress Note/Recertification   Patient will continue to benefit from skilled PT services to modify and progress therapeutic interventions, address functional mobility deficits, address ROM deficits, address strength deficits and analyze and address soft tissue restrictions to attain remaining goals.    Progress toward goals / Updated goals:    STG 3 progressing, improved ROM     PLAN  [x]  Upgrade activities as tolerated YES Continue plan of care   []  Discharge due to :    []  Other:      Therapist: Marli Bentley PT, DPT, OCS, CSCS    Date: 12/4/2017 Time: 3:41 PM     Future Appointments  Date Time Provider Candido Mccarty   12/6/2017 11:00 AM [address] [place] [address], PT [facility]   12/6/2017 4:00 PM DENISA Glover   12/8/2017 10:30 AM Ana Wheeler, PT [facility]   12/11/2017 4:30 PM [address] [place] [address], PT [facility]   12/15/2017 11:00 AM Nicolas Velazquez, PT [facility]   12/18/2017 5:30 PM [address] [place] [address], PT [facility]   12/20/2017 4:00 PM Corinna Caalk [address] [place] [address]   12/26/2017 5:00 PM Domi Torres, PT [facility]   12/28/2017 5:30 PM Domi Prom, PT [facility]   1/2/2018 5:00 PM [address] [place] [address], [address] [place] [address]   1/4/2018 5:00 PM [address] [place] [address],  [address] [place] [address]   1/9/2018 5:30 PM Domi Prom, PT [facility]   1/11/2018 5:30 PM Domi Prom, PT [facility]   1/16/2018 5:30 PM Domi Prom, PT [facility] 1/18/2018 5:30 PM Rebekah Garcia, PT [facility]   1/23/2018 5:30 PM Rebekah Garcia, PT [facility]   1/25/2018 5:30 PM Rebekah Garcia, PT [facility]

## 2017-12-04 NOTE — TELEPHONE ENCOUNTER
Left message letting her know that we wrote an rx for zofran and its up front at  for her to .  If she wants it called in, please ask for pharmacy info and we can do so

## 2017-12-04 NOTE — TELEPHONE ENCOUNTER
Patient states she is getting nauseous every time she takes her pain meds post sx 11-29-17. She is asking for something to be called in to help with the nauseous. Patient can be reached at [phone].

## 2017-12-05 NOTE — TELEPHONE ENCOUNTER
Patient is calling back with pharmacy information. Shawna Lee [address] has been added to her profile. Patient can be reached [phone].

## 2017-12-06 ENCOUNTER — OFFICE VISIT (OUTPATIENT)
Dept: ORTHOPEDIC SURGERY | Age: 27
End: 2017-12-06

## 2017-12-06 ENCOUNTER — HOSPITAL ENCOUNTER (OUTPATIENT)
Dept: PHYSICAL THERAPY | Age: 27
Discharge: HOME | End: 2017-12-06
Payer: OTHER GOVERNMENT

## 2017-12-06 VITALS
DIASTOLIC BLOOD PRESSURE: 74 MMHG | SYSTOLIC BLOOD PRESSURE: 132 MMHG | TEMPERATURE: 98.6 F | OXYGEN SATURATION: 99 % | HEART RATE: 65 BPM

## 2017-12-06 DIAGNOSIS — S83.512A RUPTURE OF ANTERIOR CRUCIATE LIGAMENT OF LEFT KNEE, INITIAL ENCOUNTER: Primary | ICD-10-CM

## 2017-12-06 PROCEDURE — 97014 ELECTRIC STIMULATION THERAPY: CPT

## 2017-12-06 PROCEDURE — 97016 VASOPNEUMATIC DEVICE THERAPY: CPT

## 2017-12-06 PROCEDURE — 97140 MANUAL THERAPY 1/> REGIONS: CPT

## 2017-12-06 PROCEDURE — 97110 THERAPEUTIC EXERCISES: CPT

## 2017-12-06 NOTE — LETTER
NOTIFICATION RETURN TO WORK / SCHOOL 
 
12/6/2017 2:26 PM 
 
Ms. Marcia Orellana [address] [address] To Whom It May Concern: 
 
Marcia Orellana is currently under the care of [address] [place] [address] James Aguilar. She will return to work on 12-11-17 at [employer] duty. No prolonged walking/standing. Sedentary duties only. Must be able to ice and elevate knee as needed. If there are questions or concerns please have the patient contact our office.  
 
 
 
Sincerely, 
 
 
DENISA Mejia

## 2017-12-06 NOTE — PROGRESS NOTES
PHYSICAL THERAPY - DAILY TREATMENT NOTE    Patient Name: Shannen Palomino        Date: 2017  : 1990   yes Patient  Verified  Visit #:   3   of   18  Insurance: Payor:  / Plan: Ghassan Brush DEPENDENTS / Product Type:  /      In time: 10:53 Out time: 12:07   Total Treatment Time: 74     Medicare Time Tracking (below)   Total Timed Codes (min):  na 1:1 Treatment Time:  na     TREATMENT AREA =  Left knee pain [M25.562]    SUBJECTIVE  Pain Level (on 0 to 10 scale):  0  / 10   Medication Changes/New allergies or changes in medical history, any new surgeries or procedures?    no  If yes, update Summary List   Subjective Functional Status/Changes:  []  No changes reported     Pt reports significant soreness the day following  Last session w/ inc swelling to the R suprapatellar region. Notes she is not currently wearing her brace to sleep, but notes she sleeps on her back all night and doesn't move.          OBJECTIVE  Modalities Rationale:     decrease inflammation, decrease pain and increase muscle contraction/control to improve patient's ability to ambulate  10 pre min [x] Estim, type/location: [facility] to the L VMO/VL in long sit, 10:30, 65 pps, 5 sec ramp                                     []  att     []  unatt     []  w/US     [x]  w/ice    []  w/heat    min []  Mechanical Traction: type/lbs                   []  pro   []  sup   []  int   []  cont    []  before manual    []  after manual    min []  Ultrasound, settings/location:      min []  Iontophoresis w/ dexamethasone, location:                                               []  take home patch       []  in clinic    min []  Ice     []  Heat    location/position:    10 min [x]  Vasopneumatic Device, press/temp: LP/LT in supine w/ bolster    min []  Other:    [x] Skin assessment post-treatment (if applicable):    [x]  intact    []  redness- no adverse reaction     []redness - adverse reaction:        42 min Therapeutic Exercise:  [x]  See flow sheet   Rationale:      increase ROM, increase strength, improve balance and increase proprioception to improve the patients ability to ambulate     12 min Manual Therapy: Patella mob, gentle hs str, prome flex/ext   Rationale:      decrease pain, increase ROM and increase tissue extensibility to improve patient's ability to ambulat     min Patient Education:  yes  Reviewed HEP   []  Progressed/Changed HEP based on: Other Objective/Functional Measures:    AROM 0-111 deg  Mild inc in effusion to R medial suprapatella  Added SLR x 4 in drop hinge brace locked in ext, req min A flex, add and ext to complete  Ambulate 2x60' in drop hinge brace, WBAT through the L LE, cueing for heel strike and toe-off  Weight shift to 110# this visit     Post Treatment Pain Level (on 0 to 10) scale:   0  / 10     ASSESSMENT  Assessment/Changes in Function:     Pt demonstrating excellent progress towards goals, tolerated additional ex per protocol w/o significant inc in pain. Advised continuation of HEP and ice/elevation to reduce swelling     []  See Progress Note/Recertification   Patient will continue to benefit from skilled PT services to modify and progress therapeutic interventions, address functional mobility deficits, address ROM deficits, address strength deficits, analyze and address soft tissue restrictions, analyze and cue movement patterns, analyze and modify body mechanics/ergonomics and instruct in home and community integration to attain remaining goals.    Progress toward goals / Updated goals:    Progressing steadily to STG #3, AROM 0-111 this visit     PLAN  []  Upgrade activities as tolerated yes Continue plan of care   []  Discharge due to :    []  Other:      Therapist: Magalie Pineda PT    Date: 12/6/2017 Time: 11:31 AM     Future Appointments  Date Time Provider Candido Mccarty   12/6/2017 1:30 PM DENISA [address]   12/8/2017 10:30 AM Magalie Pineda PT [facility]   12/11/2017 4:30 PM [address] [facility], PT [facility]   12/15/2017 11:00 AM Marguerite Tavera, PT [facility]   12/18/2017 5:30 PM [address] [facility], PT [facility]   12/20/2017 4:00 PM [address] [facility]   12/26/2017 5:00 PM Madelon Ao, PT [facility]   12/28/2017 5:30 PM Madelon Ao, PT [facility]   1/2/2018 5:00 PM Madelon Ao, PT [facility]   1/4/2018 5:00 PM Madelon Ao, PT [facility]   1/9/2018 5:30 PM Madelon Ao, PT [facility]   1/11/2018 5:30 PM Madelon Ao, PT [facility]   1/16/2018 5:30 PM Madelon Ao, PT [facility]   1/18/2018 5:30 PM Madelon Ao, PT [facility]   1/23/2018 5:30 PM Madelon Ao, PT [facility]   1/25/2018 5:30 PM Madelon Ao, PT [facility]

## 2017-12-06 NOTE — PROGRESS NOTES
Patient: Shahid Baltazar  YOB: 1990       HISTORY:  The patient presents for reevaluation of her left knee status post arthroscopic ACL reconstruction on 11/29/17. Patient is improved, states pain is a 0 out of 10.  she has gone to physical therapy. She has some questions about going back to work, and changing brace types. Patient denies any fever, chills, chest pain, shortness of breath or calf pain. There are no new illness or injuries to report since last seen in the office. PHYSICAL EXAMINATION:    Visit Vitals    /74    Pulse 65    Temp 98.6 °F (37 °C) (Oral)    SpO2 99%     The patient is a well-developed, well-nourished female in no acute distress. The patient is alert and oriented times three. The patient appears to be well groomed. Mood and affect are normal.   ORTHOPEDIC EXAM of Left knee: Inspection: Effusion present,  incisions clean, dry intact, sutures in place  TTP: none  Range of motion: 0-100 flexion  Stability: Stable  Strength: n/a  2+ distal pulses    IMPRESSION:  Status post Left knee arthroscopic ACL reconstruction. PLAN:  Incisions cleaned. Surgery was discussed at length today. Stressed to patient that nothing causes an increase in pain or swelling. Patient is PWB. Will continue with PT, wearing the brace and using crutches. She is able to go back to work 12/11/17 - light duty, sedentary duties, must be able to elevate and ice as needed. Patient did not need refill of pain medication. Patient will follow up in 3 weeks.     Patient seen and evaluated by Dr. Chelle Valentino today who agrees with treatment plan    [facility]

## 2017-12-08 ENCOUNTER — HOSPITAL ENCOUNTER (OUTPATIENT)
Dept: PHYSICAL THERAPY | Age: 27
Discharge: HOME | End: 2017-12-08
Payer: OTHER GOVERNMENT

## 2017-12-08 PROCEDURE — 97110 THERAPEUTIC EXERCISES: CPT

## 2017-12-08 PROCEDURE — 97140 MANUAL THERAPY 1/> REGIONS: CPT

## 2017-12-08 PROCEDURE — 97014 ELECTRIC STIMULATION THERAPY: CPT

## 2017-12-08 PROCEDURE — 97016 VASOPNEUMATIC DEVICE THERAPY: CPT

## 2017-12-08 NOTE — PROGRESS NOTES
PHYSICAL THERAPY - DAILY TREATMENT NOTE    Patient Name: Kylah Sabillon        Date: 2017  : 1990   yes Patient  Verified  Visit #:      18  Insurance: Payor:  / Plan: Pint PleaseGhassan Metcalf DEPENDENTS / Product Type:  /      In time: 10:28 Out time: 11:46   Total Treatment Time: 66     Medicare Time Tracking (below)   Total Timed Codes (min):  na 1:1 Treatment Time:  na     TREATMENT AREA =  Left knee pain [M25.562]    SUBJECTIVE  Pain Level (on 0 to 10 scale):  0  / 10   Medication Changes/New allergies or changes in medical history, any new surgeries or procedures?    no  If yes, update Summary List   Subjective Functional Status/Changes:  []  No changes reported     Pt reports MD was pleased w/ her progress and cleared her to wear her previous TROM brace and ambulate WBAT.  Pt note she has been attempting to ambulate WBAT w/ B crutch more at home          OBJECTIVE  Modalities Rationale:     decrease edema, decrease pain, increase tissue extensibility and increase muscle contraction/control to improve patient's ability to ambulate  10 min [x] Estim, type/location: [place] to L vmo/vl 10:30 65 pps 5 sec ramp inlong sit w/ quad set                                     []  att     [x]  unatt     []  w/US     [x]  w/ice    []  w/heat    min []  Mechanical Traction: type/lbs                   []  pro   []  sup   []  int   []  cont    []  before manual    []  after manual    min []  Ultrasound, settings/location:      min []  Iontophoresis w/ dexamethasone, location:                                               []  take home patch       []  in clinic    min []  Ice     []  Heat    location/position:    10 min [x]  Vasopneumatic Device, press/temp: Lp/lt to L knee in supine w/ knee in ext    min []  Other:    [x] Skin assessment post-treatment (if applicable):    [x]  intact    []  redness- no adverse reaction     []redness - adverse reaction:        43 min Therapeutic Exercise:  [x]  See flow sheet   Rationale:      increase ROM, increase strength, improve balance and increase proprioception to improve the patients ability to ambulate     15 min Manual Therapy: Patella mob, stm to medial hs, supine 90/90 hs str, prom knee flex   Rationale:      decrease pain, increase ROM, increase tissue extensibility and decrease trigger points to improve patient's ability to ambulate     min Patient Education:  yes  Reviewed HEP   []  Progressed/Changed HEP based on: Other Objective/Functional Measures:    Pt able to perform 10 reps SLR x 4 in brace locked in ext w/o assist this visit  arom knee flex 112 deg  During AROM measuring, pt w/ slight ER of the L foot causing inc medial knee pain     Post Treatment Pain Level (on 0 to 10) scale:   4  / 10     ASSESSMENT  Assessment/Changes in Function:     Pt reported inc medial knee pain post tx session; advised pt to maintain knee in brace and to ice prn to reduce soreness/inflammation. No red flags noted. []  See Progress Note/Recertification   Patient will continue to benefit from skilled PT services to modify and progress therapeutic interventions, address functional mobility deficits, address ROM deficits, address strength deficits, analyze and address soft tissue restrictions, analyze and cue movement patterns, analyze and modify body mechanics/ergonomics and instruct in home and community integration to attain remaining goals.    Progress toward goals / Updated goals:    Progressing to STG #2     PLAN  []  Upgrade activities as tolerated yes Continue plan of care   []  Discharge due to :    []  Other:      Therapist: Sobeida Delgado PT    Date: 12/8/2017 Time: 12:27 PM     Future Appointments  Date Time Provider Candido Mccarty   12/11/2017 4:30 PM Jenifer Daniels PT [address]   12/15/2017 11:00 AM Oscar Seth PT [address]   12/18/2017 5:30 PM Jenifer Daniels PT [address]   12/20/2017 4:00 PM Emperatriz Or Xander Hartley, [address] [facility]   12/26/2017 5:00 PM Domi Prom, PT [facility]   12/27/2017 4:30 PM DENISA Glover   12/28/2017 5:30 PM Domi Prom, PT [facility]   1/2/2018 5:00 PM [address] [facility] [address], [address] [place] [address]   1/4/2018 5:00 PM [address] [facility] [address], [address] [place] [address]   1/9/2018 5:30 PM Domi Prom, PT [facility]   1/11/2018 5:30 PM Domi Prom, PT [facility]   1/16/2018 5:30 PM Domi Prom, PT [facility]   1/18/2018 5:30 PM Domi Prom, PT [facility]   1/23/2018 5:30 PM Domi Prom, PT [facility]   1/25/2018 5:30 PM Domi Prom, PT [facility]

## 2017-12-11 ENCOUNTER — HOSPITAL ENCOUNTER (OUTPATIENT)
Dept: PHYSICAL THERAPY | Age: 27
Discharge: HOME | End: 2017-12-11
Payer: OTHER GOVERNMENT

## 2017-12-11 PROCEDURE — 97014 ELECTRIC STIMULATION THERAPY: CPT

## 2017-12-11 PROCEDURE — 97110 THERAPEUTIC EXERCISES: CPT

## 2017-12-11 PROCEDURE — 97016 VASOPNEUMATIC DEVICE THERAPY: CPT

## 2017-12-11 NOTE — PROGRESS NOTES
PHYSICAL THERAPY - DAILY TREATMENT NOTE    Patient Name: Lian Magana        Date: 2017  : 1990   YES Patient  Verified  Visit #:      18  Insurance: Payor:  / Plan: Ghassan Brush DEPENDENTS / Product Type:  /      In time: 4:34 Out time: 5:45   Total Treatment Time: 81     TREATMENT AREA =  Left knee pain [M25.562]    SUBJECTIVE  Pain Level (on 0 to 10 scale):  0  / 10   Medication Changes/New allergies or changes in medical history, any new surgeries or procedures?     NO    If yes, update Summary List   Subjective Functional Status/Changes:  []  No changes reported     No new c/o; worked full day first day back today          OBJECTIVE  Modalities Rationale:     decrease edema, decrease inflammation, decrease pain, increase tissue extensibility and increase muscle contraction/control to improve patient's ability to walk w/o assistive device  10 pre min [x] Estim, type/location:  [facility] 10on/30off, 75 pps, VM/VL                                    []  att     [x]  unatt     []  w/US     [x]  w/ice    []  w/heat    min []  Mechanical Traction: type/lbs                   []  pro   []  sup   []  int   []  cont    []  before manual    []  after manual    min []  Ultrasound, settings/location:      min []  Iontophoresis w/ dexamethasone, location:                                               []  take home patch       []  in clinic    min []  Ice     []  Heat    location/position:    10 post min [x]  Vasopneumatic Device, press/temp: Low pressure, 40 deg    min []  Other:    [x] Skin assessment post-treatment (if applicable):    [x]  intact    [x]  redness- no adverse reaction     []redness - adverse reaction:        10 Min Manual Therapy: Pat mobs, STM HS/jero, HS stretch   Rationale:      decrease pain, increase ROM and increase tissue extensibility to improve patient's ability to amb/w/o assistive device    51 min Therapeutic Exercise:  [x]  See flow sheet Rationale:      increase ROM and increase strength to improve the patients ability to amb w/o assistive device      min Patient Education:  YES  Reviewed HEP   []  Progressed/Changed HEP based on: Other Objective/Functional Measures:    Noted diff with hurdles attempt due to trail leg actively flexing of knee  Added bike today with good beckie and full reciprocal  SLR flex with no lag x 5 reps  Noted brusing medial gastroc proximally and tender  AROM with heel slide to 98 deg     Post Treatment Pain Level (on 0 to 10) scale:   0  / 10     ASSESSMENT  Assessment/Changes in Function:     Improved quad control     []  See Progress Note/Recertification   Patient will continue to benefit from skilled PT services to modify and progress therapeutic interventions, address functional mobility deficits, address ROM deficits, address strength deficits, analyze and address soft tissue restrictions, analyze and cue movement patterns and analyze and modify body mechanics/ergonomics to attain remaining goals.    Progress toward goals / Updated goals:    STG 2 progressing improved quad control     PLAN  [x]  Upgrade activities as tolerated YES Continue plan of care   []  Discharge due to :    []  Other:      Therapist: Jina Amor PT, DPT, OCS, CSCS    Date: 12/11/2017 Time: 4:35 PM     Future Appointments  Date Time Provider Candido Mccarty   12/15/2017 11:00 AM Davie Saint, PT [facility]   12/18/2017 5:30 PM [address] [place] [address], PT [facility]   12/20/2017 4:00 PM Corinna Michael [address] [facility]   12/26/2017 5:00 PM [address] [facility], [address] [place] [address]   12/27/2017 4:30 PM DENISA Foster   12/28/2017 5:30 PM Tracy Ortiz, PT [facility]   1/2/2018 5:00 PM [address] [facility], [address] [place] [address]   1/4/2018 5:00 PM [address] [place] [address], [address] [place] [address]   1/9/2018 5:30 PM Tracy Ortiz PT [facility]   1/11/2018 5:30 PM Tracy Ortiz PT [facility]   1/16/2018 5:30 PM Tracy Ortiz, PT [facility]   1/18/2018 5:30 PM Luci Eden, PT [facility]   1/23/2018 5:30 PM Luci Eden, PT [facility]   1/25/2018 5:30 PM Luci Eden, PT [facility]

## 2017-12-14 ENCOUNTER — HOSPITAL ENCOUNTER (OUTPATIENT)
Dept: PHYSICAL THERAPY | Age: 27
Discharge: HOME | End: 2017-12-14
Payer: OTHER GOVERNMENT

## 2017-12-14 PROCEDURE — 97140 MANUAL THERAPY 1/> REGIONS: CPT | Performed by: PHYSICAL THERAPIST

## 2017-12-14 PROCEDURE — 97014 ELECTRIC STIMULATION THERAPY: CPT | Performed by: PHYSICAL THERAPIST

## 2017-12-14 PROCEDURE — 97116 GAIT TRAINING THERAPY: CPT | Performed by: PHYSICAL THERAPIST

## 2017-12-14 PROCEDURE — 97110 THERAPEUTIC EXERCISES: CPT | Performed by: PHYSICAL THERAPIST

## 2017-12-14 NOTE — PROGRESS NOTES
PHYSICAL THERAPY - DAILY TREATMENT NOTE    Patient Name: Lian Magana        Date: 2017  : 1990   yes Patient  Verified  Visit #:      18  Insurance: Payor:  / Plan: Ghassan Brush DEPENDENTS / Product Type:  /      In time: 4:30 Out time: 5:55   Total Treatment Time: 80     Medicare Time Tracking (below)   Total Timed Codes (min):  na 1:1 Treatment Time:  na     TREATMENT AREA =  Left knee pain [M25.562]    SUBJECTIVE  Pain Level (on 0 to 10 scale):  0  / 10   Medication Changes/New allergies or changes in medical history, any new surgeries or procedures?    no  If yes, update Summary List   Subjective Functional Status/Changes:  []  No changes reported     I was on my feet all day today at work.         OBJECTIVE  Modalities Rationale:     decrease edema, decrease inflammation, decrease pain and increase muscle contraction/control to improve patient's ability to perform ADLs  10 pre min [] Estim, type/location: [facility] 10on/30off, 75 pps, VM/VL                                     []  att     [x]  unatt     []  w/US     [x]  w/ice    []  w/heat    min []  Mechanical Traction: type/lbs                   []  pro   []  sup   []  int   []  cont    []  before manual    []  after manual    min []  Ultrasound, settings/location:      min []  Iontophoresis w/ dexamethasone, location:                                               []  take home patch       []  in clinic    min []  Ice     []  Heat    location/position:    10 post min [x]  Vasopneumatic Device, press/temp: Low pressure, 40 deg    min []  Other:    [x] Skin assessment post-treatment (if applicable):    [x]  intact    [x]  redness- no adverse reaction     []redness - adverse reaction:        35 Min Therapeutic Exercise:  [x]  See flow sheet   Rationale:      increase ROM, increase strength, improve coordination, improve balance and increase proprioception to improve the patients ability to perform ADLs 15 min Manual Therapy: Pat mobs sup/inf, STM HS/gastroc, DTM adductors, HS stretch, PROM flex/ext   Rationale:      decrease pain, increase ROM, increase tissue extensibility and decrease trigger points to improve patient's ability to perform ADLs    15 min Gait Training See below   Rationale:      decrease pain, increase ROM, increase tissue extensibility and decrease trigger points to improve patient's ability to perform ADLs     min Patient Education:  yes  Reviewed HEP   []  Progressed/Changed HEP based on: Other Objective/Functional Measures:    Continues c bruising and tenderness L medial proximal gastroc  TTP medial HS. Increased tone L adductors  SLR flex x 10 c no lag  AROM c heel slide to 108 deg  FOTO 46 (IE = 1)  GT: pt ambulated length of clinic with crutch on R - slight lateral lean otherwise safe and stable gait  Negotiated stairs with use of B HR and one HR one crutch as pt reported she has been \"butt bumping\" her way up/down stairs      Post Treatment Pain Level (on 0 to 10) scale:   0  / 10     ASSESSMENT  Assessment/Changes in Function:     Quad control and knee flexion continuing to increase. []  See Progress Note/Recertification   Patient will continue to benefit from skilled PT services to modify and progress therapeutic interventions, address functional mobility deficits, address ROM deficits, address strength deficits, analyze and address soft tissue restrictions, analyze and cue movement patterns and analyze and modify body mechanics/ergonomics to attain remaining goals.    Progress toward goals / Updated goals:    STG #1 and 2 met  Progressing towards LTG 2     PLAN  []  Upgrade activities as tolerated yes Continue plan of care   []  Discharge due to :    []  Other:      Therapist: ALFRED Strickland    Date: 12/14/2017 Time: 5:08 PM     Future Appointments  Date Time Provider Candido Mccarty   12/18/2017 4:00 PM Arelis Calix PT [place] [id number]   12/20/2017 4:00 PM Luann Duarte, PT [facility]   12/26/2017 5:00 PM Celio aRyo, PT [facility]   12/27/2017 4:30 PM DENISA [address]   12/28/2017 5:30 PM Celio Rayo, PT [facility]   1/2/2018 5:00 PM [address] [facility] [address] [facility] [address], [address] [place] [address]   1/4/2018 5:00 PM [address] [place] [address] Drive, [address] [place] [address]   1/9/2018 5:30 PM Celio Rayo, PT [facility]   1/11/2018 5:30 PM Celio Rayo, PT [facility]   1/16/2018 5:30 PM Celio Rayo, PT [facility]   1/18/2018 5:30 PM Celio Rayo, PT [facility]   1/23/2018 5:30 PM Celio Rayo, PT [facility]   1/25/2018 5:30 PM Celio Rayo, PT [facility]

## 2017-12-15 ENCOUNTER — HOSPITAL ENCOUNTER (OUTPATIENT)
Dept: PHYSICAL THERAPY | Age: 27
End: 2017-12-15
Payer: OTHER GOVERNMENT

## 2017-12-18 ENCOUNTER — HOSPITAL ENCOUNTER (OUTPATIENT)
Dept: PHYSICAL THERAPY | Age: 27
Discharge: HOME | End: 2017-12-18
Payer: OTHER GOVERNMENT

## 2017-12-18 PROCEDURE — 97016 VASOPNEUMATIC DEVICE THERAPY: CPT

## 2017-12-18 PROCEDURE — 97110 THERAPEUTIC EXERCISES: CPT

## 2017-12-18 PROCEDURE — 97116 GAIT TRAINING THERAPY: CPT

## 2017-12-18 PROCEDURE — 97140 MANUAL THERAPY 1/> REGIONS: CPT

## 2017-12-18 NOTE — PROGRESS NOTES
PHYSICAL THERAPY - DAILY TREATMENT NOTE    Patient Name: Kevin Pang        Date: 2017  : 1990   yes Patient  Verified  Visit #:     Insurance: Payor:  / Plan: Ghassan Brush DEPENDENTS / Product Type:  /      In time: 6:00 Out time: 7:00   Total Treatment Time: 60     Medicare Time Tracking (below)   Total Timed Codes (min):  na 1:1 Treatment Time:  na     TREATMENT AREA =  Left knee pain [M25.562]    SUBJECTIVE  Pain Level (on 0 to 10 scale):  0  / 10   Medication Changes/New allergies or changes in medical history, any new surgeries or procedures?    no  If yes, update Summary List   Subjective Functional Status/Changes:  []  No changes reported     Pt reports her knee gets sore at the end of her work day but notes she has progressively increased her steps at work daily and notes no adverse effects.  Reports she continues to wear her brace and has been ambulating w/ single crutch          OBJECTIVE  Modalities Rationale:     decrease inflammation and decrease pain to improve patient's ability to ambulate   min [] Estim, type/location:                                      []  att     []  unatt     []  w/US     []  w/ice    []  w/heat    min []  Mechanical Traction: type/lbs                   []  pro   []  sup   []  int   []  cont    []  before manual    []  after manual    min []  Ultrasound, settings/location:      min []  Iontophoresis w/ dexamethasone, location:                                               []  take home patch       []  in clinic    min []  Ice     []  Heat    location/position:    10 min [x]  Vasopneumatic Device, press/temp: LP, 38 deg to the L knee in long sit    min []  Other:    [] Skin assessment post-treatment (if applicable):    []  intact    []  redness- no adverse reaction     []redness - adverse reaction:        28 min Therapeutic Exercise:  [x]  See flow sheet   Rationale:      increase ROM, increase strength, improve coordination, improve balance and increase proprioception to improve the patients ability to ambulate     12 min Manual Therapy: stm to distal quad, adductor longus, bicep femoris; inf patella mob, PROM knee flexion, HS str   Rationale:      decrease pain, increase ROM, increase tissue extensibility and decrease trigger points to improve patient's ability to ambulate    10 min Gait Training: Small, NR yesica negotiation w/ single crutch, 2x leading R, 2x leading L; NR stair negotiation w/ 1 crutch and 1 HR 4 steps 2x   Rationale: To improve coordination, balance, proprioception to improve gait symmetry     min Patient Education:  yes  Reviewed HEP   []  Progressed/Changed HEP based on: Other Objective/Functional Measures:    AROM knee flexion 115 deg, AAROM knee flex 118 deg  Discontinued Ecuadorean due to ability to perform SLR w/o extensor lag and normal quad set  Added minisquats 0-30 deg to improve LE strength/stability  Pt demo mild L hip hike and circumduction of L LE for toe clearance NR yesica negotiation leading w/ R     Post Treatment Pain Level (on 0 to 10) scale:   0  / 10     ASSESSMENT  Assessment/Changes in Function:     Pt demo slowly improving knee flexion AROM and improving gait symmetry. Progressed HEP and provided pt w/ print out     []  See Progress Note/Recertification   Patient will continue to benefit from skilled PT services to modify and progress therapeutic interventions, address functional mobility deficits, address ROM deficits, address strength deficits, analyze and address soft tissue restrictions, analyze and cue movement patterns, analyze and modify body mechanics/ergonomics, assess and modify postural abnormalities, address imbalance/dizziness and instruct in home and community integration to attain remaining goals.    Progress toward goals / Updated goals:    STG #3 met, AROM 0-115 deg this visit     PLAN  []  Upgrade activities as tolerated yes Continue plan of care   [] Discharge due to :    []  Other:      Therapist: Dandy Kirk PT    Date: 12/18/2017 Time: 6:27 PM     Future Appointments  Date Time Provider Candido Mccarty   12/20/2017 4:00 PM Gorge. Fernanda [address] [place] [address]   12/26/2017 5:00 PM [address] [place] [address], [address] [place] [address]   12/27/2017 4:30 PM DENISA Russ [address]   12/28/2017 5:30 PM [address] [place] [address] [address] [place] [address]   1/2/2018 5:00 PM [address] [place] [address] [address] [place] [address]   1/4/2018 5:00 PM [address] [place] [address] [address] [place] [address]   1/9/2018 5:30 PM Irvin Tang, PT [facility]   1/11/2018 5:30 PM Irvin Tang, PT [facility]   1/16/2018 5:30 PM Irvin Tang, PT [facility]   1/18/2018 5:30 PM Irvin Tang, PT [facility]   1/23/2018 5:30 PM Irvin Tang, PT [facility]   1/25/2018 5:30 PM Irvin Tang, PT [facility]

## 2017-12-20 ENCOUNTER — HOSPITAL ENCOUNTER (OUTPATIENT)
Dept: PHYSICAL THERAPY | Age: 27
End: 2017-12-20
Payer: OTHER GOVERNMENT

## 2017-12-21 ENCOUNTER — APPOINTMENT (OUTPATIENT)
Dept: PHYSICAL THERAPY | Age: 27
End: 2017-12-21
Payer: OTHER GOVERNMENT

## 2017-12-26 ENCOUNTER — HOSPITAL ENCOUNTER (OUTPATIENT)
Dept: PHYSICAL THERAPY | Age: 27
Discharge: HOME | End: 2017-12-26
Payer: OTHER GOVERNMENT

## 2017-12-26 PROCEDURE — 97110 THERAPEUTIC EXERCISES: CPT | Performed by: PHYSICAL THERAPIST

## 2017-12-26 PROCEDURE — 97140 MANUAL THERAPY 1/> REGIONS: CPT | Performed by: PHYSICAL THERAPIST

## 2017-12-26 NOTE — PROGRESS NOTES
Marilee Claudio   PHYSICAL THERAPY - DAILY TREATMENT NOTE    Patient Name: Aliyah Wilson        Date: 2017  : 1990   yes Patient  Verified  Visit #:     Insurance: Payor:  / Plan: Ghassan Brush DEPENDENTS / Product Type:  /      In time: 443 Out time: 544   Total Treatment Time: 61     Medicare Time Tracking (below)   Total Timed Codes (min):  na 1:1 Treatment Time:  na     TREATMENT AREA =  Left knee pain [M25.562]    SUBJECTIVE  Pain Level (on 0 to 10 scale):  0  / 10   Medication Changes/New allergies or changes in medical history, any new surgeries or procedures?    no  If yes, update Summary List   Subjective Functional Status/Changes:  []  No changes reported     See pn          OBJECTIVE  Modalities Rationale:     decrease inflammation, decrease pain and increase tissue extensibility to improve patient's ability to complete adls    min [] Estim, type/location:                                      []  att     []  unatt     []  w/US     []  w/ice    []  w/heat    min []  Mechanical Traction: type/lbs                   []  pro   []  sup   []  int   []  cont    []  before manual    []  after manual    min []  Ultrasound, settings/location:      min []  Iontophoresis w/ dexamethasone, location:                                               []  take home patch       []  in clinic   10 min [x]  Ice     []  Heat    location/position: Long sit    min []  Vasopneumatic Device, press/temp:     min []  Other:    [x] Skin assessment post-treatment (if applicable):    []  intact    [x]  redness- no adverse reaction     []redness - adverse reaction:        35 min Therapeutic Exercise:  [x]  See flow sheet   Rationale:      increase ROM, increase strength, improve coordination, improve balance and increase proprioception to improve the patients ability to complete adls     15 min Manual Therapy: stm quad, knee prom, cfm incision, pat sup/inf mobs   Rationale:      decrease pain, increase ROM, increase tissue extensibility and decrease trigger points to improve patient's ability to complete adls       min Patient Education:  yes  Reviewed HEP   []  Progressed/Changed HEP based on: Other Objective/Functional Measures:    See pn     Post Treatment Pain Level (on 0 to 10) scale:   0  / 10     ASSESSMENT  Assessment/Changes in Function:     See pn     []  See Progress Note/Recertification   Patient will continue to benefit from skilled PT services to modify and progress therapeutic interventions, address functional mobility deficits, address ROM deficits, address strength deficits, analyze and address soft tissue restrictions, analyze and cue movement patterns, analyze and modify body mechanics/ergonomics, assess and modify postural abnormalities, address imbalance/dizziness and instruct in home and community integration to attain remaining goals.    Progress toward goals / Updated goals:    See pn     PLAN  []  Upgrade activities as tolerated yes Continue plan of care   []  Discharge due to :    []  Other:      Therapist: Rene Da Silva, PT    Date: 12/26/2017 Time: 6:12 PM     Future Appointments  Date Time Provider Candido Mccarty   12/27/2017 4:30 PM DENISA Pagan   12/28/2017 5:30 PM [address] [place] [address], [address] [place] [address]   1/2/2018 5:00 PM [address] [place] [address], [address] [place] [address]   1/4/2018 5:00 PM [address] [place] [address], [address] [place] [address]   1/9/2018 5:30 PM Rene Da Silva, PT [facility]   1/11/2018 5:30 PM Rene Da Silva, PT [facility]   1/16/2018 5:30 PM Rene Da Silva, PT [facility]   1/18/2018 5:30 PM Rene Da Silva, PT [facility]   1/23/2018 5:30 PM Rene Da Silva, PT [facility]   1/25/2018 5:30 PM Rene Da Silva, PT [facility]

## 2017-12-26 NOTE — PROGRESS NOTES
.[facility] [address] THERAPY  [address] [place] [address] 201,[facility], [facility] - Phone: [phone]  Fax: [phone]  PROGRESS NOTE  Patient Name: Bruce Robbins : 1990   Treatment/Medical Diagnosis: Left knee pain [M25.562]   Referral Source: Nelly Matthews,*     Date of Initial Visit:  17 Attended Visits: 8 Missed Visits: 0     SUMMARY OF TREATMENT  Pt was seen for IE and 7 f/u visits with treatment consisting of therapeutic exercises for knee rom/strenthening, gait training with and without AD, modalities including electrical stimulation for quad contraction and instruction on hep. CURRENT STATUS  Pt has made good progress with PT. She currently ambulates all distances without crutches but brace due to apprehension of stability. arom 0-123 but still lacks tke during stance phase of gait. She still has difficulty descending stairs and squatting due to LE weakness. Would like to continue with therapy to progress this. Goal/Measure of Progress Goal Met? 1. Pt will demonstrate 10 reps 4\" lateral tap downs for proper technique/no pain to improve reciprocal stairs   Status at last Eval: na Current Status: 2\" progressing   2. Pt will improve FOTO score >/=60/100 to indicate function    Status at last Eval:  Current Status: 46/100 progressing   3. Pt will demo L knee arom = R to A with return to PLOF   Status at last Eval:  Current Status: 0-124 L   0-133 R progressing     New Goals to be achieved in __4__  weeks:  1. Cont as above  2.   3.   RECOMMENDATIONS  Cont PT an additional 2-3x/4 weeks  If you have any questions/comments please contact us directly at [phone]. Thank you for allowing us to assist in the care of your patient.     Therapist Signature: Sherrill Rosales DPT, [facility]  Date: 2017     Time: 6:29 PM   NOTE TO PHYSICIAN:  PLEASE COMPLETE THE ORDERS BELOW AND FAX TO   [facility] [phone]  If you are unable to process this request in 24 hours please contact our office: [phone]    ___ I have read the above report and request that my patient continue as recommended.   ___ I have read the above report and request that my patient continue therapy with the following changes/special instructions:_________________________________________________________   ___ I have read the above report and request that my patient be discharged from therapy.      Physician Signature:        Date:       Time:

## 2017-12-27 ENCOUNTER — OFFICE VISIT (OUTPATIENT)
Dept: ORTHOPEDIC SURGERY | Age: 27
End: 2017-12-27

## 2017-12-27 VITALS
SYSTOLIC BLOOD PRESSURE: 123 MMHG | OXYGEN SATURATION: 100 % | RESPIRATION RATE: 18 BRPM | WEIGHT: 155 LBS | HEART RATE: 68 BPM | DIASTOLIC BLOOD PRESSURE: 74 MMHG | TEMPERATURE: 98 F | HEIGHT: 68 IN | BODY MASS INDEX: 23.49 KG/M2

## 2017-12-27 DIAGNOSIS — S83.512A RUPTURE OF ANTERIOR CRUCIATE LIGAMENT OF LEFT KNEE, INITIAL ENCOUNTER: Primary | ICD-10-CM

## 2017-12-27 NOTE — PROGRESS NOTES
Patient: Mali Trent  YOB: 1990       HISTORY:  The patient presents for reevaluation of her left knee status post arthroscopic ACL reconstruction on 11/29/17. Patient is improved, states pain is a 0 out of 10.  she has gone to physical therapy and feels her ROM and strength are improving. She is doing well and does not have questions or concerns today. Patient denies any fever, chills, chest pain, shortness of breath or calf pain. There are no new illness or injuries to report since last seen in the office. PHYSICAL EXAMINATION:    Visit Vitals    /74    Pulse 68    Temp 98 °F (36.7 °C) (Oral)    Resp 18    Ht 5' 8\" (1.727 m)    Wt 155 lb (70.3 kg)    SpO2 100%    BMI 23.57 kg/m2     The patient is a well-developed, well-nourished female in no acute distress. The patient is alert and oriented times three. The patient appears to be well groomed. Mood and affect are normal.   ORTHOPEDIC EXAM of Left knee: Inspection: Effusion not present, incisions well healed  TTP: none  Range of motion: 0-120 flexion, straight leg raise with no lag  Stability: Stable  Strength: 4/5  2+ distal pulses    IMPRESSION:  Status post Left knee arthroscopic ACL reconstruction. PLAN:  Pt is doing well post operatively  Stressed to patient that nothing causes an increase in pain or swelling. Patient is WBAT  Will continue with PT gave new order today, wearing the brace when at work, crutches already d/c   She has gone back to work and has had no problems  Patient did not need refill of pain medication. Patient will follow up in 4 weeks.     Patient seen and evaluated by Dr. Rosalinda Bautista today who agrees with treatment plan    [facility]

## 2017-12-28 ENCOUNTER — HOSPITAL ENCOUNTER (OUTPATIENT)
Dept: PHYSICAL THERAPY | Age: 27
Discharge: HOME | End: 2017-12-28
Payer: OTHER GOVERNMENT

## 2017-12-28 PROCEDURE — 97140 MANUAL THERAPY 1/> REGIONS: CPT | Performed by: PHYSICAL THERAPIST

## 2017-12-28 PROCEDURE — 97110 THERAPEUTIC EXERCISES: CPT | Performed by: PHYSICAL THERAPIST

## 2017-12-28 NOTE — PROGRESS NOTES
[facility] PHYSICAL THERAPY - DAILY TREATMENT NOTE    Patient Name: Ozell Cockayne        Date: 2017  : 1990   yes Patient  Verified  Visit #:     Insurance: Payor:  / Plan: Ghassan Brush DEPENDENTS / Product Type:  /      In time: 520 Out time: 615   Total Treatment Time: 54     Medicare Time Tracking (below)   Total Timed Codes (min):  na 1:1 Treatment Time:  na     TREATMENT AREA =  Left knee pain [M25.562]    SUBJECTIVE  Pain Level (on 0 to 10 scale):  0  / 10   Medication Changes/New allergies or changes in medical history, any new surgeries or procedures?    no  If yes, update Summary List   Subjective Functional Status/Changes:  []  No changes reported     Doctor said he was pleased with my rom and to keep pushign my motion.           OBJECTIVE  Modalities Rationale:     decrease inflammation, decrease pain and increase tissue extensibility to improve patient's ability to complete adls    min [] Estim, type/location:                                      []  att     []  unatt     []  w/US     []  w/ice    []  w/heat    min []  Mechanical Traction: type/lbs                   []  pro   []  sup   []  int   []  cont    []  before manual    []  after manual    min []  Ultrasound, settings/location:      min []  Iontophoresis w/ dexamethasone, location:                                               []  take home patch       []  in clinic   10 min [x]  Ice     []  Heat    location/position: Long sit    min []  Vasopneumatic Device, press/temp:     min []  Other:    [x] Skin assessment post-treatment (if applicable):    []  intact    [x]  redness- no adverse reaction     []redness - adverse reaction:        30 min Therapeutic Exercise:  [x]  See flow sheet   Rationale:      increase ROM, increase strength, improve coordination, improve balance and increase proprioception to improve the patients ability to complete adls     15 min Manual Therapy: stm quad, knee prom, cfm incision, pat sup/inf mobs, hs stretch   Rationale:      decrease pain, increase ROM, increase tissue extensibility and decrease trigger points to improve patient's ability to complete adls       min Patient Education:  yes  Reviewed HEP   []  Progressed/Changed HEP based on: Other Objective/Functional Measures:    Improved tibial scar tissue mobility compared to previous session - no change with medial incision   Progressed te as per flow sheet - no lag noted with addition of weight during slr     Post Treatment Pain Level (on 0 to 10) scale:   0  / 10     ASSESSMENT  Assessment/Changes in Function:   Per pt md requested continued use of brace at work \"so they do not over work me\" but otherwise prn  Pt advised to complete te performed in clinic at gym one additional day a week to progress strength and stabilization of knee      []  See Progress Note/Recertification   Patient will continue to benefit from skilled PT services to modify and progress therapeutic interventions, address functional mobility deficits, address ROM deficits, address strength deficits, analyze and address soft tissue restrictions, analyze and cue movement patterns, analyze and modify body mechanics/ergonomics, assess and modify postural abnormalities, address imbalance/dizziness and instruct in home and community integration to attain remaining goals.    Progress toward goals / Updated goals:    Progressing with [facility] per protocol     PLAN  []  Upgrade activities as tolerated yes Continue plan of care   []  Discharge due to :    []  Other:      Therapist: Bhupinder Tineo PT    Date: 12/28/2017 Time: 6:12 PM     Future Appointments  Date Time Provider Candido Mccarty   12/28/2017 5:30 PM [address] [place] [address] [address] [place] [address]   1/2/2018 5:00 PM [address] [place] [address] [address] [place] [address]   1/4/2018 5:00 PM [address] [place] [address] [address] [place] [address]   1/9/2018 5:30 PM Bhupinder Tineo PT [facility]   1/11/2018 5:30 PM Bhupinder Tineo PT [facility]   1/16/2018 5:30 PM [address] [place] [address] Drive, [address] [facility]   1/18/2018 5:30 PM aNdia Edge, PT [facility]   1/23/2018 5:30 PM Nadia Edge, PT [facility]   1/24/2018 4:30 PM DENISA R[address]   1/25/2018 5:30 PM Nadia Edge, PT [facility]

## 2018-01-02 ENCOUNTER — HOSPITAL ENCOUNTER (OUTPATIENT)
Dept: PHYSICAL THERAPY | Age: 28
Discharge: HOME | End: 2018-01-02
Payer: OTHER GOVERNMENT

## 2018-01-02 PROCEDURE — 97140 MANUAL THERAPY 1/> REGIONS: CPT | Performed by: PHYSICAL THERAPIST

## 2018-01-02 PROCEDURE — 97110 THERAPEUTIC EXERCISES: CPT | Performed by: PHYSICAL THERAPIST

## 2018-01-02 NOTE — PROGRESS NOTES
[facility] PHYSICAL THERAPY - DAILY TREATMENT NOTE    Patient Name: Jaswinder Mortensen        Date: 2018  : 1990   yes Patient  Verified  Visit #:   10   of   18  Insurance: Payor:  / Plan: Ghassan Brush DEPENDENTS / Product Type:  /      In time: 500 Out time: 600   Total Treatment Time: 62     Medicare Time Tracking (below)   Total Timed Codes (min):  na 1:1 Treatment Time:  na     TREATMENT AREA =  Left knee pain [M25.562]    SUBJECTIVE  Pain Level (on 0 to 10 scale):  0  / 10   Medication Changes/New allergies or changes in medical history, any new surgeries or procedures?    no  If yes, update Summary List   Subjective Functional Status/Changes:  []  No changes reported       I did 25' on the stair master over the weekend and it felt really good - no pain/swelling etc. I have been trying ot work on my incisions and I think I might be doing that a little bit more excessively because it was a little tender.         OBJECTIVE  Modalities Rationale:     decrease inflammation, decrease pain and increase tissue extensibility to improve patient's ability to complete adls    min [] Estim, type/location:                                      []  att     []  unatt     []  w/US     []  w/ice    []  w/heat    min []  Mechanical Traction: type/lbs                   []  pro   []  sup   []  int   []  cont    []  before manual    []  after manual    min []  Ultrasound, settings/location:      min []  Iontophoresis w/ dexamethasone, location:                                               []  take home patch       []  in clinic   10 min [x]  Ice     []  Heat    location/position: Long sit    min []  Vasopneumatic Device, press/temp:     min []  Other:    [x] Skin assessment post-treatment (if applicable):    [x]  intact    []  redness- no adverse reaction     []redness  adverse reaction:        35 min Therapeutic Exercise:  [x]  See flow sheet   Rationale:      increase ROM, increase strength, improve coordination, improve balance and increase proprioception to improve the patients ability to complete adls     13 min Manual Therapy: Stm/ dtm quad, knee prom, cfm incision, pat sup/inf mobs, hs stretch   Rationale:      decrease pain, increase ROM, increase tissue extensibility and decrease trigger points to improve patient's ability to complete adls       min Patient Education:  yes  Reviewed HEP   []  Progressed/Changed HEP based on: Other Objective/Functional Measures:  Continues to lack tke during stance phase of gait - added in retro walking to address and advised to perform at home  ttp along proximal quad and supra patella pouch   Post Treatment Pain Level (on 0 to 10) scale:   0  / 10     ASSESSMENT  Assessment/Changes in Function:     Denies pain or swelling with addition of new te    []  See Progress Note/Recertification   Patient will continue to benefit from skilled PT services to modify and progress therapeutic interventions, address functional mobility deficits, address ROM deficits, address strength deficits, analyze and address soft tissue restrictions, analyze and cue movement patterns, analyze and modify body mechanics/ergonomics, assess and modify postural abnormalities, address imbalance/dizziness and instruct in home and community integration to attain remaining goals.    Progress toward goals / Updated goals:  Steady progress towards all goals      PLAN  []  Upgrade activities as tolerated yes Continue plan of care   []  Discharge due to :    []  Other:      Therapist: Stefan Burden PT    Date: 1/2/2018 Time: 6:12 PM     Future Appointments  Date Time Provider Candido Mccarty   1/2/2018 5:00 PM Stefan Burden PT [facility]   1/4/2018 5:00 PM [address] [place] [address],  [address]   1/9/2018 5:30 PM Stefan Burden PT [facility]   1/11/2018 5:30 PM Stefan Burden PT [facility]   1/16/2018 5:30 PM Stefan Burden PT [facility]   1/18/2018 5:30 PM Stefan Burden PT [facility] 1/23/2018 5:30 PM [address] [place] [address] [place], [address] [facility]   1/24/2018 4:30 PM DENISA [address]   1/25/2018 5:30 PM JACQUELINE Larkin [facility]

## 2018-01-04 ENCOUNTER — APPOINTMENT (OUTPATIENT)
Dept: PHYSICAL THERAPY | Age: 28
End: 2018-01-04
Payer: OTHER GOVERNMENT

## 2018-01-09 ENCOUNTER — HOSPITAL ENCOUNTER (OUTPATIENT)
Dept: PHYSICAL THERAPY | Age: 28
Discharge: HOME | End: 2018-01-09
Payer: OTHER GOVERNMENT

## 2018-01-09 PROCEDURE — 97140 MANUAL THERAPY 1/> REGIONS: CPT | Performed by: PHYSICAL THERAPIST

## 2018-01-09 PROCEDURE — 97110 THERAPEUTIC EXERCISES: CPT | Performed by: PHYSICAL THERAPIST

## 2018-01-09 NOTE — PROGRESS NOTES
[facility]   PHYSICAL THERAPY - DAILY TREATMENT NOTE    Patient Name: Jaswinder Mortensen        Date: 2018  : 1990   yes Patient  Verified  Visit #:      18  Insurance: Payor:  / Plan: Ghassan Brush DEPENDENTS / Product Type:  /      In time: 530 Out time: 630   Total Treatment Time: 61     Medicare Time Tracking (below)   Total Timed Codes (min):  na 1:1 Treatment Time:  na     TREATMENT AREA =  Left knee pain [M25.562]    SUBJECTIVE  Pain Level (on 0 to 10 scale):  0  / 10   Medication Changes/New allergies or changes in medical history, any new surgeries or procedures?    no  If yes, update Summary List   Subjective Functional Status/Changes:  []  No changes reported              OBJECTIVE  Modalities Rationale:     decrease inflammation, decrease pain and increase tissue extensibility to improve patient's ability to complete adls    min [] Estim, type/location:                                      []  att     []  unatt     []  w/US     []  w/ice    []  w/heat    min []  Mechanical Traction: type/lbs                   []  pro   []  sup   []  int   []  cont    []  before manual    []  after manual    min []  Ultrasound, settings/location:      min []  Iontophoresis w/ dexamethasone, location:                                               []  take home patch       []  in clinic   10 min [x]  Ice     []  Heat    location/position: Long sit    min []  Vasopneumatic Device, press/temp:     min []  Other:    [x] Skin assessment post-treatment (if applicable):    [x]  intact    []  redness- no adverse reaction     []redness  adverse reaction:        35 min Therapeutic Exercise:  [x]  See flow sheet   Rationale:      increase ROM, increase strength, improve coordination, improve balance and increase proprioception to improve the patients ability to complete adls     15 min Manual Therapy: Stm/ dtm quad, knee prom, cfm incision, pat sup/inf mobs, hs stretch   Rationale: decrease pain, increase ROM, increase tissue extensibility and decrease trigger points to improve patient's ability to complete adls       min Patient Education:  yes  Reviewed HEP   []  Progressed/Changed HEP based on: Other Objective/Functional Measures:  Knee arom 0-121 with muscular tightness at end range  Visible stick at distal tibial incision - advised pt not to remove and contact MD if becomes irritated  Progressed te as per flow sheet and protocol    Post Treatment Pain Level (on 0 to 10) scale:   0  / 10     ASSESSMENT  Assessment/Changes in Function:   Demonstrated good tolerance to exercise progression    []  See Progress Note/Recertification   Patient will continue to benefit from skilled PT services to modify and progress therapeutic interventions, address functional mobility deficits, address ROM deficits, address strength deficits, analyze and address soft tissue restrictions, analyze and cue movement patterns, analyze and modify body mechanics/ergonomics, assess and modify postural abnormalities, address imbalance/dizziness and instruct in home and community integration to attain remaining goals.    Progress toward goals / Updated goals:  Pt approximately 6 weeks post op and progressing with strength goals     PLAN  []  Upgrade activities as tolerated yes Continue plan of care   []  Discharge due to :    []  Other:      Therapist: Elmer Quiros PT    Date: 1/9/2018 Time: 6:12 PM     Future Appointments  Date Time Provider Candido Mccarty   1/11/2018 5:30 PM Elmer Quiros PT [facility]   1/16/2018 5:30 PM [address] [place] [address],  [address]   1/18/2018 5:30 PM Elmer Quiros PT [facility]   1/23/2018 5:30 PM [address] [place] [address] [address]   1/24/2018 4:30 PM DENISA Carlin [address]   1/25/2018 5:30 PM Elmer Quiros PT [facility]

## 2018-01-11 ENCOUNTER — HOSPITAL ENCOUNTER (OUTPATIENT)
Dept: PHYSICAL THERAPY | Age: 28
Discharge: HOME | End: 2018-01-11
Payer: OTHER GOVERNMENT

## 2018-01-11 PROCEDURE — 97140 MANUAL THERAPY 1/> REGIONS: CPT | Performed by: PHYSICAL THERAPIST

## 2018-01-11 PROCEDURE — 97110 THERAPEUTIC EXERCISES: CPT | Performed by: PHYSICAL THERAPIST

## 2018-01-11 NOTE — PROGRESS NOTES
Steve Gustafson   PHYSICAL THERAPY - DAILY TREATMENT NOTE    Patient Name: Kristan Lorenzo        Date: 2018  : 1990   yes Patient  Verified  Visit #:   15      18  Insurance: Payor:  / Plan: Ghassan Brush DEPENDENTS / Product Type:  /      In time: 515 Out time: 627   Total Treatment Time: 72     Medicare Time Tracking (below)   Total Timed Codes (min):  na 1:1 Treatment Time:  na     TREATMENT AREA =  Left knee pain [M25.562]    SUBJECTIVE  Pain Level (on 0 to 10 scale):  0  / 10   Medication Changes/New allergies or changes in medical history, any new surgeries or procedures?    no  If yes, update Summary List   Subjective Functional Status/Changes:  []  No changes reported   I was sore after last time which is to be expected but no pain         OBJECTIVE  Modalities Rationale:     decrease inflammation, decrease pain and increase tissue extensibility to improve patient's ability to complete adls    min [] Estim, type/location:                                      []  att     []  unatt     []  w/US     []  w/ice    []  w/heat    min []  Mechanical Traction: type/lbs                   []  pro   []  sup   []  int   []  cont    []  before manual    []  after manual    min []  Ultrasound, settings/location:      min []  Iontophoresis w/ dexamethasone, location:                                               []  take home patch       []  in clinic   10 min [x]  Ice     []  Heat    location/position: Long sit    min []  Vasopneumatic Device, press/temp:     min []  Other:    [x] Skin assessment post-treatment (if applicable):    [x]  intact    []  redness- no adverse reaction     []redness  adverse reaction:        40 min Therapeutic Exercise:  [x]  See flow sheet   Rationale:      increase ROM, increase strength, improve coordination, improve balance and increase proprioception to improve the patients ability to complete adls     15 min Manual Therapy: Stm/ dtm quad, knee prom, cfm incision, pat sup/inf mobs, hs stretch   Rationale:      decrease pain, increase ROM, increase tissue extensibility and decrease trigger points to improve patient's ability to complete adls       min Patient Education:  yes  Reviewed HEP   []  Progressed/Changed HEP based on: Other Objective/Functional Measures:  Performed prone quad stretching without c/o pain  Able to squat to 75 degrees without pain or compensation    Post Treatment Pain Level (on 0 to 10) scale:   0  / 10     ASSESSMENT  Assessment/Changes in Function:   Demonstrated good tolerance to exercise progression with no increase in pain or swelling over the last 48 hours. Advised to performed resisted te one additional day/week in gym    []  See Progress Note/Recertification   Patient will continue to benefit from skilled PT services to modify and progress therapeutic interventions, address functional mobility deficits, address ROM deficits, address strength deficits, analyze and address soft tissue restrictions, analyze and cue movement patterns, analyze and modify body mechanics/ergonomics, assess and modify postural abnormalities, address imbalance/dizziness and instruct in home and community integration to attain remaining goals.    Progress toward goals / Updated goals:  FOTO increased an additional 6 points from last PN to 52/100     PLAN  []  Upgrade activities as tolerated yes Continue plan of care   []  Discharge due to :    []  Other:      Therapist: Lynette Garcia PT    Date: 1/11/2018 Time: 6:12 PM     Future Appointments  Date Time Provider Candido Mccarty   1/16/2018 5:30 PM Lynette Garcia PT [facility]   1/18/2018 5:30 PM Lynette Garcia PT [facility]   1/23/2018 5:30 PM [address] [place] [address],  [address]   1/24/2018 4:30 PM DENISA Bright 70   1/25/2018 5:30 PM Lynette Garcia PT [facility]

## 2018-01-16 ENCOUNTER — HOSPITAL ENCOUNTER (OUTPATIENT)
Dept: PHYSICAL THERAPY | Age: 28
Discharge: HOME | End: 2018-01-16
Payer: OTHER GOVERNMENT

## 2018-01-16 PROCEDURE — 97110 THERAPEUTIC EXERCISES: CPT | Performed by: PHYSICAL THERAPIST

## 2018-01-16 PROCEDURE — 97140 MANUAL THERAPY 1/> REGIONS: CPT | Performed by: PHYSICAL THERAPIST

## 2018-01-16 NOTE — PROGRESS NOTES
Miller Abdi   PHYSICAL THERAPY - DAILY TREATMENT NOTE    Patient Name: Jessica Sharp        Date: 2018  : 1990   yes Patient  Verified  Visit #:   15   of   18  Insurance: Payor:  / Plan: Ghassan Brush DEPENDENTS / Product Type:  /      In time: 528 Out time: 648   Total Treatment Time: 79     Medicare Time Tracking (below)   Total Timed Codes (min):  na 1:1 Treatment Time:  na     TREATMENT AREA =  Left knee pain [M25.562]    SUBJECTIVE  Pain Level (on 0 to 10 scale):  2  / 10   Medication Changes/New allergies or changes in medical history, any new surgeries or procedures?    no  If yes, update Summary List   Subjective Functional Status/Changes:  []  No changes reported   Just a little sore I have worked like 9 days straight and went to the gym to try and do the exercises          OBJECTIVE  Modalities Rationale:     decrease inflammation, decrease pain and increase tissue extensibility to improve patient's ability to complete adls    min [] Estim, type/location:                                      []  att     []  unatt     []  w/US     []  w/ice    []  w/heat    min []  Mechanical Traction: type/lbs                   []  pro   []  sup   []  int   []  cont    []  before manual    []  after manual    min []  Ultrasound, settings/location:      min []  Iontophoresis w/ dexamethasone, location:                                               []  take home patch       []  in clinic   10 min [x]  Ice     []  Heat    location/position: Long sit    min []  Vasopneumatic Device, press/temp:     min []  Other:    [x] Skin assessment post-treatment (if applicable):    [x]  intact    []  redness- no adverse reaction     []redness  adverse reaction:        40 min Therapeutic Exercise:  [x]  See flow sheet   Rationale:      increase ROM, increase strength, improve coordination, improve balance and increase proprioception to improve the patients ability to complete adls     20 min Manual Therapy: Stm/ dtm quad, hs and gastroc, knee prom, cfm incision   Rationale:      decrease pain, increase ROM, increase tissue extensibility and decrease trigger points to improve patient's ability to complete adls       min Patient Education:  yes  Reviewed HEP   []  Progressed/Changed HEP based on: Other Objective/Functional Measures:  Increased tenderness and pain along medial hs and gastroc with palpable trp throughout   Progressed te as per flow sheet - pt with apprehension in performing but able to complete with good form   Post Treatment Pain Level (on 0 to 10) scale:   0  / 10     ASSESSMENT  Assessment/Changes in Function:     Pt with good tolerance to exercise progression to improve strength/stability of knee      []  See Progress Note/Recertification   Patient will continue to benefit from skilled PT services to modify and progress therapeutic interventions, address functional mobility deficits, address ROM deficits, address strength deficits, analyze and address soft tissue restrictions, analyze and cue movement patterns, analyze and modify body mechanics/ergonomics, assess and modify postural abnormalities, address imbalance/dizziness and instruct in home and community integration to attain remaining goals.    Progress toward goals / Updated goals:  Steady progress towards all established goals     PLAN  []  Upgrade activities as tolerated yes Continue plan of care   []  Discharge due to :    []  Other:      Therapist: Bibiana Strong PT    Date: 1/16/2018 Time: 6:12 PM     Future Appointments  Date Time Provider Candido Mccarty   1/16/2018 5:30 PM Bibiana Strong PT [facility]   1/18/2018 5:30 PM Bibiana Strong PT [facility]   1/23/2018 5:30 PM [address] [place] [address], [address] [place] [address]   1/24/2018 4:30 PM DENISA West   1/25/2018 5:30 PM [address] [place] [address], [address] [place] [address]   1/29/2018 6:00 PM [address] [place] [address], PT [facility]   1/31/2018 5:00 PM [address] [place] [address], PT [facility] 2/5/2018 6:00 PM [address] [facility]   2/7/2018 5:30 PM [address] [facility]   2/13/2018 5:30 PM Kuldeep Balbuena, PT [facility]   2/15/2018 5:30 PM Kuldeep Balbuena, PT [facility]

## 2018-01-18 ENCOUNTER — HOSPITAL ENCOUNTER (OUTPATIENT)
Dept: PHYSICAL THERAPY | Age: 28
Discharge: HOME | End: 2018-01-18
Payer: OTHER GOVERNMENT

## 2018-01-18 PROCEDURE — 97110 THERAPEUTIC EXERCISES: CPT | Performed by: PHYSICAL THERAPIST

## 2018-01-18 PROCEDURE — 97140 MANUAL THERAPY 1/> REGIONS: CPT | Performed by: PHYSICAL THERAPIST

## 2018-01-18 NOTE — PROGRESS NOTES
Rory Miles PHYSICAL THERAPY - DAILY TREATMENT NOTE    Patient Name: Rosalba Gallego        Date: 2018  : 1990   yes Patient  Verified  Visit #:   15   of   18  Insurance: Payor:  / Plan: Ghassan Brush DEPENDENTS / Product Type:  /      In time: 525 Out time: 630   Total Treatment Time: 72     Medicare Time Tracking (below)   Total Timed Codes (min):  na 1:1 Treatment Time:  na     TREATMENT AREA =  Left knee pain [M25.562]    SUBJECTIVE  Pain Level (on 0 to 10 scale):  2  / 10   Medication Changes/New allergies or changes in medical history, any new surgeries or procedures?    no  If yes, update Summary List   Subjective Functional Status/Changes:  []  No changes reported   Calf is not too bad after last time.           OBJECTIVE  Modalities Rationale:     decrease inflammation, decrease pain and increase tissue extensibility to improve patient's ability to complete adls    min [] Estim, type/location:                                      []  att     []  unatt     []  w/US     []  w/ice    []  w/heat    min []  Mechanical Traction: type/lbs                   []  pro   []  sup   []  int   []  cont    []  before manual    []  after manual    min []  Ultrasound, settings/location:      min []  Iontophoresis w/ dexamethasone, location:                                               []  take home patch       []  in clinic   10 min [x]  Ice     []  Heat    location/position: Long sit    min []  Vasopneumatic Device, press/temp:     min []  Other:    [x] Skin assessment post-treatment (if applicable):    [x]  intact    []  redness- no adverse reaction     []redness  adverse reaction:        40 min Therapeutic Exercise:  [x]  See flow sheet   Rationale:      increase ROM, increase strength, improve coordination, improve balance and increase proprioception to improve the patients ability to complete adls     15 min Manual Therapy: Stm/ dtm quad, hs and gastroc, knee prom, cfm incision Rationale:      decrease pain, increase ROM, increase tissue extensibility and decrease trigger points to improve patient's ability to complete adls       min Patient Education:  yes  Reviewed HEP   []  Progressed/Changed HEP based on: Other Objective/Functional Measures:  arom 0-131 with tightness no pain at end range, -lachman   Increased weights on te as per flow sheet - good form and no pain   Post Treatment Pain Level (on 0 to 10) scale:   0  / 10     ASSESSMENT  Assessment/Changes in Function:     Pt with good tolerance to exercise progression to improve strength/stability of knee      []  See Progress Note/Recertification   Patient will continue to benefit from skilled PT services to modify and progress therapeutic interventions, address functional mobility deficits, address ROM deficits, address strength deficits, analyze and address soft tissue restrictions, analyze and cue movement patterns, analyze and modify body mechanics/ergonomics, assess and modify postural abnormalities, address imbalance/dizziness and instruct in home and community integration to attain remaining goals. Progress toward goals / Updated goals:  Pt to reduce frequency of visits due to high copay.  Pt was given advanced hep including progression of lateral step downs      PLAN  []  Upgrade activities as tolerated yes Continue plan of care   []  Discharge due to :    []  Other:      Therapist: Zuleima Jacobs PT    Date: 1/18/2018 Time: 6:12 PM     Future Appointments  Date Time Provider Candido Mccarty   1/23/2018 5:30 PM Zuleima Jacobs PT [facility]   1/24/2018 4:30 PM DENISA Cruz   1/25/2018 5:30 PM [address] [place] [address], [address] [place] [address]   1/29/2018 6:00 PM [employer] [place], PT [facility]   1/31/2018 5:00 PM [place], PT [facility]   2/5/2018 6:00 PM [employer] [place], PT [facility]   2/7/2018 5:30 PM [employer] [place], PT [facility]   2/13/2018 5:30 PM [address] [place] [address], [address] [place] [address]   2/15/2018 5:30 PM [address] [place] [address], [address] [facility]

## 2018-01-23 ENCOUNTER — HOSPITAL ENCOUNTER (OUTPATIENT)
Dept: PHYSICAL THERAPY | Age: 28
Discharge: HOME | End: 2018-01-23
Payer: OTHER GOVERNMENT

## 2018-01-23 PROCEDURE — 97140 MANUAL THERAPY 1/> REGIONS: CPT | Performed by: PHYSICAL THERAPIST

## 2018-01-23 PROCEDURE — 97110 THERAPEUTIC EXERCISES: CPT | Performed by: PHYSICAL THERAPIST

## 2018-01-23 NOTE — PROGRESS NOTES
[address] [facility] THERAPY  [address], [place] [address],[facility], [facility] - Phone: [phone]  Fax: [phone]  PROGRESS NOTE  Patient Name: Clovis Chiang : 1990   Treatment/Medical Diagnosis: Left knee pain [M25.562]   Referral Source: Olegario Rivas,*     Date of Initial Visit:  17 Attended Visits: 15 Missed Visits: 0     SUMMARY OF TREATMENT  Pt was seen for IE and 14 f/u visits with treatment consisting of therapeutic exercises for knee rom/strenthening, gait training with and without AD, modalities including electrical stimulation for quad contraction and instruction on hep. CURRENT STATUS  Pt has made good progress with PT. She currently ambulates all distances and negotiates stairs with normalized gait pattern. Mayo Kohli arom 2 degree hyperextension to 133. She has progressed with all CKC exercises during this phase in protocol. Will advance to next stage with appropriate timeline     Goal/Measure of Progress Goal Met? 1. Pt will demonstrate 10 reps 4\" lateral tap downs for proper technique/no pain to improve reciprocal stairs   Status at last Eval: 2\" Current Status:  Yes                                           2.  Pt will improve FOTO score >/=60/100 to indicate function    Status at last Eval: 46 Current Status: 52 progressing   3. Pt will demo L knee arom = R to A with return to PLOF   Status at last Eval: 0-124 L   0-133 R Current Status: See above yes     New Goals to be achieved in __4__  weeks:  1. Achieve goal #2  2. Pt will perform 8\" lateral step downs in order to improve eccentric quad strength for squats  3. Pt will progress to next phase of protocol (initiate linear dynamic exercise)   RECOMMENDATIONS  Cont PT an additional 1x/4 weeks  If you have any questions/comments please contact us directly at [phone]. Thank you for allowing us to assist in the care of your patient.     Therapist Signature: Unique Lopez XAVIER CHAPIN  Date: 1/23/2018     Time: 6:29 PM   NOTE TO PHYSICIAN:  PLEASE COMPLETE THE ORDERS BELOW AND FAX TO   [facility]: [phone]  If you are unable to process this request in 24 hours please contact our office: [phone]    ___ I have read the above report and request that my patient continue as recommended.   ___ I have read the above report and request that my patient continue therapy with the following changes/special instructions:_________________________________________________________   ___ I have read the above report and request that my patient be discharged from therapy.      Physician Signature:        Date:       Time:

## 2018-01-23 NOTE — PROGRESS NOTES
Jaime Mcarthur   PHYSICAL THERAPY - DAILY TREATMENT NOTE    Patient Name: Ozell Cockayne        Date: 2018  : 1990   yes Patient  Verified  Visit #:     Insurance: Payor:  / Plan: Ghassan Brush DEPENDENTS / Product Type:  /      In time: 530 Out time: 635   Total Treatment Time: 72     Medicare Time Tracking (below)   Total Timed Codes (min):  na 1:1 Treatment Time:  na     TREATMENT AREA =  Left knee pain [M25.562]    SUBJECTIVE  Pain Level (on 0 to 10 scale):  2  / 10   Medication Changes/New allergies or changes in medical history, any new surgeries or procedures?    no  If yes, update Summary List   Subjective Functional Status/Changes:  []  No changes reported   See pn         OBJECTIVE  Modalities Rationale:     decrease inflammation, decrease pain and increase tissue extensibility to improve patient's ability to complete adls    min [] Estim, type/location:                                      []  att     []  unatt     []  w/US     []  w/ice    []  w/heat    min []  Mechanical Traction: type/lbs                   []  pro   []  sup   []  int   []  cont    []  before manual    []  after manual    min []  Ultrasound, settings/location:      min []  Iontophoresis w/ dexamethasone, location:                                               []  take home patch       []  in clinic   10 min [x]  Ice     []  Heat    location/position: Long sit    min []  Vasopneumatic Device, press/temp:     min []  Other:    [x] Skin assessment post-treatment (if applicable):    [x]  intact    []  redness- no adverse reaction     []redness  adverse reaction:        40 min Therapeutic Exercise:  [x]  See flow sheet   Rationale:      increase ROM, increase strength, improve coordination, improve balance and increase proprioception to improve the patients ability to complete adls     15 min Manual Therapy: Stm/ dtm quad, hs and gastroc, knee prom, cfm incision   Rationale:      decrease pain, increase ROM, increase tissue extensibility and decrease trigger points to improve patient's ability to complete adls       min Patient Education:  yes  Reviewed HEP   []  Progressed/Changed HEP based on: Other Objective/Functional Measures:  See pn  2 degrees hyperextension - 133    Post Treatment Pain Level (on 0 to 10) scale:   0  / 10     ASSESSMENT  Assessment/Changes in Function:     See pn     []  See Progress Note/Recertification   Patient will continue to benefit from skilled PT services to modify and progress therapeutic interventions, address functional mobility deficits, address ROM deficits, address strength deficits, analyze and address soft tissue restrictions, analyze and cue movement patterns, analyze and modify body mechanics/ergonomics, assess and modify postural abnormalities, address imbalance/dizziness and instruct in home and community integration to attain remaining goals.    Progress toward goals / Updated goals:  See pn     PLAN  []  Upgrade activities as tolerated yes Continue plan of care   []  Discharge due to :    []  Other:      Therapist: Edu Dumont PT    Date: 1/23/2018 Time: 6:12 PM     Future Appointments  Date Time Provider Candido Mccarty   1/24/2018 4:30 PM DENISA Razo   1/25/2018 5:30 PM [address] [place] [address]   1/29/2018 6:00 PM Kiki Newton, PT [facility]   1/31/2018 5:00 PM Kiki Newton PT [facility]   2/5/2018 6:00 PM Kiki Newton PT [facility]   2/7/2018 5:30 PM Kiki Newton PT [facility]   2/13/2018 5:30 PM Edu Dumont PT [facility]   2/15/2018 5:30 PM Edu Dumont PT [facility]

## 2018-01-24 ENCOUNTER — OFFICE VISIT (OUTPATIENT)
Dept: ORTHOPEDIC SURGERY | Age: 28
End: 2018-01-24

## 2018-01-24 DIAGNOSIS — S83.512D RUPTURE OF ANTERIOR CRUCIATE LIGAMENT OF LEFT KNEE, SUBSEQUENT ENCOUNTER: Primary | ICD-10-CM

## 2018-01-24 NOTE — PROGRESS NOTES
Patient: Mitchell Savage  YOB: 1990       HISTORY:  The patient presents for reevaluation of her left knee status post arthroscopic ACL reconstruction on 11/29/17. Patient is improved, states pain is a 0 out of 10.  she has gone to physical therapy and feels her ROM and strength are improving. Does note some swelling today after she did PT on her own Monday and had formal PT Tuesday. No pain. She is doing well and does not have questions or concerns today. Patient denies any fever, chills, chest pain, shortness of breath or calf pain. There are no new illness or injuries to report since last seen in the office. PHYSICAL EXAMINATION:    There were no vitals taken for this visit. The patient is a well-developed, well-nourished female in no acute distress. The patient is alert and oriented times three. The patient appears to be well groomed. Mood and affect are normal.   ORTHOPEDIC EXAM of Left knee: Inspection: small effusion incisions well healed  TTP: none  Range of motion: 0-120 flexion, straight leg raise with no lag  Stability: Stable  Strength: 4+/5  2+ distal pulses    IMPRESSION:  Status post Left knee arthroscopic ACL reconstruction. PLAN:  Pt is doing well post operatively  Stressed to patient that nothing causes an increase in pain or swelling. Patient is WBAT  Will continue with PT - can decrease down to once or twice a week secondary to insurance issues  Will order custom sport brace now  Will cont to limit prolonged walking and standing  Patient did not need refill of pain medication. Patient will follow up in 4 weeks.     Patient seen and evaluated by Dr. Rodrigo Cespedes today who agrees with treatment plan    TAWNY [facility]ole [facility]

## 2018-01-29 ENCOUNTER — HOSPITAL ENCOUNTER (OUTPATIENT)
Dept: PHYSICAL THERAPY | Age: 28
Discharge: HOME | End: 2018-01-29
Payer: OTHER GOVERNMENT

## 2018-01-29 PROCEDURE — 97140 MANUAL THERAPY 1/> REGIONS: CPT

## 2018-01-29 PROCEDURE — 97110 THERAPEUTIC EXERCISES: CPT

## 2018-01-29 PROCEDURE — 97016 VASOPNEUMATIC DEVICE THERAPY: CPT

## 2018-01-30 NOTE — PROGRESS NOTES
PHYSICAL THERAPY - DAILY TREATMENT NOTE    Patient Name: Debbie Macedo        Date: 2018  : 1990   yes Patient  Verified  Visit #:     Insurance: Payor:  / Plan: BrianaGhassan Metcalf DEPENDENTS / Product Type:  /      In time: 5:50 Out time: 7:03   Total Treatment Time: 73     Medicare Time Tracking (below)   Total Timed Codes (min):  na 1:1 Treatment Time:  na     TREATMENT AREA =  Left knee pain [M25.562]    SUBJECTIVE  Pain Level (on 0 to 10 scale):  0  / 10   Medication Changes/New allergies or changes in medical history, any new surgeries or procedures?    no  If yes, update Summary List   Subjective Functional Status/Changes:  []  No changes reported     Pt reports her knee has been swollen since last week and reports MD is concerned regarding this. Pt denies instability or pain. OBJECTIVE  Modalities Rationale:     decrease edema and decrease inflammation to improve patient's ability to ambulate   min [] Estim, type/location:                                      []  att     []  unatt     []  w/US     []  w/ice    []  w/heat    min []  Mechanical Traction: type/lbs                   []  pro   []  sup   []  int   []  cont    []  before manual    []  after manual    min []  Ultrasound, settings/location:      min []  Iontophoresis w/ dexamethasone, location:                                               []  take home patch       []  in clinic    min []  Ice     []  Heat    location/position:    10 min [x]  Vasopneumatic Device, press/temp:  To the L knee, MP/LT in long sit    min []  Other:    [x] Skin assessment post-treatment (if applicable):    [x]  intact    []  redness- no adverse reaction     []redness  adverse reaction:        51 min Therapeutic Exercise:  [x]  See flow sheet   Rationale:      increase ROM, increase strength and increase proprioception to improve the patients ability to ambulate, complete ADLs     12 min Manual Therapy: Retro massage to L knee w/ elevation, DTM to L quad, PROM knee flexion   Rationale:      decrease pain, increase ROM, increase tissue extensibility, decrease edema  and decrease trigger points to improve patient's ability to ambulate     min Patient Education:  yes  Reviewed HEP   []  Progressed/Changed HEP based on: Other Objective/Functional Measures:    Noted effusion supramedial patella L  No instability noted, (-)Lachman's  Able to complete all therex w/o pain  AROM knee flexion 139 deg  Cont to ambulate w/ mild limp     Post Treatment Pain Level (on 0 to 10) scale:   0  / 10     ASSESSMENT  Assessment/Changes in Function:     Encouraged pt wear ace bandage or compression sleeve while at work, as well as elevate the L LE while typing notes in order to reduce effusion to L knee. []  See Progress Note/Recertification   Patient will continue to benefit from skilled PT services to modify and progress therapeutic interventions, address functional mobility deficits, address strength deficits, analyze and address soft tissue restrictions, analyze and cue movement patterns, analyze and modify body mechanics/ergonomics and instruct in home and community integration to attain remaining goals.    Progress toward goals / Updated goals:    Progressing slowly to LTG #2, demo's proper form from 4\" step     PLAN  []  Upgrade activities as tolerated yes Continue plan of care   []  Discharge due to :    []  Other:      Therapist: Daria Marcelo PT    Date: 1/29/2018 Time: 7:06 PM     Future Appointments  Date Time Provider Candido Mccarty   1/31/2018 5:00 PM Daria Marcelo PT [facility]   2/5/2018 6:00 PM Daria Marcelo PT [facility]   2/13/2018 5:30 PM [address] [place] [address],  [address]   2/21/2018 4:30 PM DENISA [address]

## 2018-01-31 ENCOUNTER — APPOINTMENT (OUTPATIENT)
Dept: PHYSICAL THERAPY | Age: 28
End: 2018-01-31
Payer: OTHER GOVERNMENT

## 2018-02-05 ENCOUNTER — HOSPITAL ENCOUNTER (OUTPATIENT)
Dept: PHYSICAL THERAPY | Age: 28
Discharge: HOME | End: 2018-02-05
Payer: OTHER GOVERNMENT

## 2018-02-05 PROCEDURE — 97110 THERAPEUTIC EXERCISES: CPT

## 2018-02-05 PROCEDURE — 97140 MANUAL THERAPY 1/> REGIONS: CPT

## 2018-02-05 NOTE — PROGRESS NOTES
PHYSICAL THERAPY - DAILY TREATMENT NOTE    Patient Name: Salud Reaves        Date: 2018  : 1990   yes Patient  Verified  Visit #:     Insurance: Payor:  / Plan: Ghassan Brush DEPENDENTS / Product Type:  /      In time: 5:58 Out time: 7:00   Total Treatment Time: 62     Medicare Time Tracking (below)   Total Timed Codes (min):  na 1:1 Treatment Time:  na     TREATMENT AREA =  Left knee pain [M25.562]    SUBJECTIVE  Pain Level (on 0 to 10 scale):  0  / 10   Medication Changes/New allergies or changes in medical history, any new surgeries or procedures?    no  If yes, update Summary List   Subjective Functional Status/Changes:  []  No changes reported     Pt reports her knee remains swollen but denies significant pain. Reports she attempted back squat this weekend but felt off balance and noticed significant weight shift; reports she wasn't able to achieve parallel         OBJECTIVE  Modalities Rationale:     decrease edema, decrease inflammation and decrease pain to improve patient's ability to ambulate   min [] Estim, type/location:                                      []  att     []  unatt     []  w/US     []  w/ice    []  w/heat    min []  Mechanical Traction: type/lbs                   []  pro   []  sup   []  int   []  cont    []  before manual    []  after manual    min []  Ultrasound, settings/location:      min []  Iontophoresis w/ dexamethasone, location:                                               []  take home patch       []  in clinic   10 min [x]  Ice     []  Heat    location/position:  To the L knee    min []  Vasopneumatic Device, press/temp:     min []  Other:    [x] Skin assessment post-treatment (if applicable):    [x]  intact    []  redness- no adverse reaction     []redness - adverse reaction:        42 min Therapeutic Exercise:  [x]  See flow sheet   Rationale:      increase ROM, increase strength and increase proprioception to improve the patients ability to compelte aDLs     10 min Manual Therapy: STM to distal adductors, vmo, RF, BF; prone quad str   Rationale:      decrease pain, increase ROM, increase tissue extensibility and decrease trigger points to improve patient's ability to descend stairs     min Patient Education:  yes  Reviewed HEP   []  Progressed/Changed HEP based on: Other Objective/Functional Measures:    Inc ease noted 4\" lat tap downs; plan to progress to 6\" at NV  Significant reduction in edema noted this visit     Post Treatment Pain Level (on 0 to 10) scale:   0  / 10     ASSESSMENT  Assessment/Changes in Function:     Advised pt to perform box squats stopping above parallel/prior to weight shift. Progress therex per flow at NV   []  See Progress Note/Recertification   Patient will continue to benefit from skilled PT services to modify and progress therapeutic interventions, address functional mobility deficits, address ROM deficits, address strength deficits, analyze and address soft tissue restrictions, analyze and cue movement patterns, address imbalance/dizziness and instruct in home and community integration to attain remaining goals.    Progress toward goals / Updated goals:    Met LTG #1, updated LTG to achieve 10reps 6\" tap down w/ proper form , pain free     PLAN  []  Upgrade activities as tolerated yes Continue plan of care   []  Discharge due to :    []  Other:      Therapist: Junior Rancho PT    Date: 2/5/2018 Time: 6:14 PM     Future Appointments  Date Time Provider Candido Mccarty   2/13/2018 5:30 PM [address] [place] [address],  [address]   2/21/2018 4:30 PM DENISA Castro [address] [facility]

## 2018-02-07 ENCOUNTER — APPOINTMENT (OUTPATIENT)
Dept: PHYSICAL THERAPY | Age: 28
End: 2018-02-07
Payer: OTHER GOVERNMENT

## 2018-02-13 ENCOUNTER — HOSPITAL ENCOUNTER (OUTPATIENT)
Dept: PHYSICAL THERAPY | Age: 28
Discharge: HOME | End: 2018-02-13
Payer: OTHER GOVERNMENT

## 2018-02-13 PROCEDURE — 97110 THERAPEUTIC EXERCISES: CPT | Performed by: PHYSICAL THERAPIST

## 2018-02-13 PROCEDURE — 97140 MANUAL THERAPY 1/> REGIONS: CPT | Performed by: PHYSICAL THERAPIST

## 2018-02-13 NOTE — PROGRESS NOTES
Stanislaw Day   PHYSICAL THERAPY - DAILY TREATMENT NOTE    Patient Name: Kimberly Griffith        Date: 2018  : 1990   yes Patient  Verified  Visit #:      of   18  Insurance: Payor:  / Plan: Ghassan Brush DEPENDENTS / Product Type:  /      In time: 530 Out time: 635   Total Treatment Time: 72     Medicare Time Tracking (below)   Total Timed Codes (min):  na 1:1 Treatment Time:  na     TREATMENT AREA =  Left knee pain [M25.562]    SUBJECTIVE  Pain Level (on 0 to 10 scale):  2  / 10   Medication Changes/New allergies or changes in medical history, any new surgeries or procedures?    no  If yes, update Summary List   Subjective Functional Status/Changes:  []  No changes reported   See pn         OBJECTIVE  Modalities Rationale:     decrease inflammation, decrease pain and increase tissue extensibility to improve patient's ability to complete adls    min [] Estim, type/location:                                      []  att     []  unatt     []  w/US     []  w/ice    []  w/heat    min []  Mechanical Traction: type/lbs                   []  pro   []  sup   []  int   []  cont    []  before manual    []  after manual    min []  Ultrasound, settings/location:      min []  Iontophoresis w/ dexamethasone, location:                                               []  take home patch       []  in clinic   10 min [x]  Ice     []  Heat    location/position: Long sit    min []  Vasopneumatic Device, press/temp:     min []  Other:    [x] Skin assessment post-treatment (if applicable):    [x]  intact    []  redness- no adverse reaction     []redness  adverse reaction:        40 min Therapeutic Exercise:  [x]  See flow sheet   Rationale:      increase ROM, increase strength, improve coordination, improve balance and increase proprioception to improve the patients ability to complete adls     15 min Manual Therapy: Stm/ dtm quad, hs and gastroc, knee prom, cfm incision   Rationale:      decrease pain, increase ROM, increase tissue extensibility and decrease trigger points to improve patient's ability to complete adls       min Patient Education:  yes  Reviewed HEP   []  Progressed/Changed HEP based on: Other Objective/Functional Measures:  See pn  2 degrees hyperextension - 135 without pain   Post Treatment Pain Level (on 0 to 10) scale:   0  / 10     ASSESSMENT  Assessment/Changes in Function:     See pn     []  See Progress Note/Recertification   Patient will continue to benefit from skilled PT services to modify and progress therapeutic interventions, address functional mobility deficits, address ROM deficits, address strength deficits, analyze and address soft tissue restrictions, analyze and cue movement patterns, analyze and modify body mechanics/ergonomics, assess and modify postural abnormalities, address imbalance/dizziness and instruct in home and community integration to attain remaining goals.    Progress toward goals / Updated goals:  See pn     PLAN  []  Upgrade activities as tolerated yes Continue plan of care   []  Discharge due to :    []  Other:      Therapist: Bhupinder Tineo, PT    Date: 2/13/2018 Time: 6:12 PM     Future Appointments  Date Time Provider Candido Mccarty   2/21/2018 4:30 PM DENISA Fonseca

## 2018-02-14 NOTE — PROGRESS NOTES
[facility] PHYSICAL THERAPY - DAILY TREATMENT NOTE    Patient Name: Debbie Macedo        Date: 2018  : 1990   yes Patient  Verified  Visit #:     Insurance: Payor:  / Plan: Ghassan Brush DEPENDENTS / Product Type:  /      In time: 530 Out time: 635   Total Treatment Time: 65     Medicare Time Tracking (below)   Total Timed Codes (min):  na 1:1 Treatment Time:  na     TREATMENT AREA =  Left knee pain [M25.562]    SUBJECTIVE  Pain Level (on 0 to 10 scale):  0  / 10   Medication Changes/New allergies or changes in medical history, any new surgeries or procedures?    no  If yes, update Summary List   Subjective Functional Status/Changes:  []  No changes reported     I have not been using the ace wrap at work because I cannot really tell a difference when I use it and when I do not.  I do not have any pain its just annoying because my leg looks fat          OBJECTIVE  Modalities Rationale:     decrease edema, decrease inflammation, decrease pain and increase tissue extensibility to improve patient's ability to complete adls   min [] Estim, type/location:                                      []  att     []  unatt     []  w/US     []  w/ice    []  w/heat    min []  Mechanical Traction: type/lbs                   []  pro   []  sup   []  int   []  cont    []  before manual    []  after manual    min []  Ultrasound, settings/location:      min []  Iontophoresis w/ dexamethasone, location:                                               []  take home patch       []  in clinic   10 min [x]  Ice     []  Heat    location/position: Long sit    min []  Vasopneumatic Device, press/temp:     min []  Other:    [x] Skin assessment post-treatment (if applicable):    []  intact    [x]  redness- no adverse reaction     []redness - adverse reaction:        40 min Therapeutic Exercise:  [x]  See flow sheet   Rationale:      increase ROM, increase strength, improve coordination, improve balance and increase proprioception to improve the patients ability to return to sport     15 min Manual Therapy: stm anterior knee, quad dtm and stretch, prone quad stretch   Rationale:      decrease pain, increase ROM, increase tissue extensibility and decrease trigger points to improve patient's ability to complete adls       min Patient Education:  yes  Reviewed HEP   []  Progressed/Changed HEP based on: Other Objective/Functional Measures:    Denied any tenderness with palpation to knee - noted supra patellar effusion but no limitation in patellar mobility detected. Knee arom -3-135  Initiated session with squat form - performed 10 body weight reps requiring visual cues to correct, performed on incline board to bias quad and pt able to reach parallel without form break  Modified te as per flow sheet      Post Treatment Pain Level (on 0 to 10) scale:   0  / 10     ASSESSMENT  Assessment/Changes in Function:     Reviewed at length with pt on appropriate LE Strengthening program as pt performing all te and prior hep 2-3x/week outside of therapy. Denies any pain with any adls including stairs and squat but still continues with joint line effusion likely due to repetitive knee motion. Will email pt additional hep     []  See Progress Note/Recertification   Patient will continue to benefit from skilled PT services to modify and progress therapeutic interventions, address functional mobility deficits, address ROM deficits, address strength deficits, analyze and address soft tissue restrictions, analyze and cue movement patterns, analyze and modify body mechanics/ergonomics, assess and modify postural abnormalities, address imbalance/dizziness and instruct in home and community integration to attain remaining goals.    Progress toward goals / Updated goals:    Progressed to 6\" box for lateral step downs without difficulty      PLAN  []  Upgrade activities as tolerated yes Continue plan of care   []  Discharge due to :    []  Other:      Therapist: Nori Denney, PT    Date: 2/14/2018 Time: 1:05 PM     Future Appointments  Date Time Provider Candido Mccarty   2/19/2018 5:30 PM Briana Lanza, PT [facility]   2/21/2018 4:30 PM DENISA [address]   2/27/2018 5:30 PM [address] [place] [address], [address] [place] [address]   3/1/2018 5:30 PM [address] [place] [address] [address]   3/12/2018 5:30 PM Briana Lanza, PT [facility]   3/15/2018 5:30 PM Nori Denney, PT [facility]

## 2018-02-15 ENCOUNTER — APPOINTMENT (OUTPATIENT)
Dept: PHYSICAL THERAPY | Age: 28
End: 2018-02-15
Payer: OTHER GOVERNMENT

## 2018-02-19 ENCOUNTER — HOSPITAL ENCOUNTER (OUTPATIENT)
Dept: PHYSICAL THERAPY | Age: 28
Discharge: HOME | End: 2018-02-19
Payer: OTHER GOVERNMENT

## 2018-02-19 PROCEDURE — 97140 MANUAL THERAPY 1/> REGIONS: CPT

## 2018-02-19 PROCEDURE — 97110 THERAPEUTIC EXERCISES: CPT

## 2018-02-19 NOTE — PROGRESS NOTES
[address] [place] [address] PHYSICAL THERAPY  [address] [place] [address], [facility], [facility] - Phone: [phone]  Fax: [phone]  PROGRESS NOTE  Patient Name: Chu Pritchett : 1990   Treatment/Medical Diagnosis: Left knee pain [M25.562]   Referral Source: Cassy Ley,*     Date of Initial Visit: 17 Attended Visits: 19 Missed Visits: 0     SUMMARY OF TREATMENT  Pt has attended 19 sessions of PT s/p L ACLR. PT tx has consisted of therex, manual tx, modalities, and HEP in order to improve L knee ROM, LE strength/stability, balance/propriception, dec pain, and improve functional mobility. CURRENT STATUS  Pt has made steady progress towards goals. Pt does continue to show L suprapatellar effusion, likely related to excessive gym activity outside of PT. Pt has been instructed in appropriate LE strengthening program and advised to refrain from rigorous strength training outside of PT; since making these modifications L knee effusion has been noted to have reduced. Pt demo L knee AROM 3-0-135. Demo's proper technique 6\" lateral tap down w/o pain. GROC +7 \"a very great deal better\"    Goal/Measure of Progress Goal Met? 1. Pt will improve FOTO score to >/= 60/100 to indicate function   Status at last Eval: 52/100 Current Status: 48/100 no, however not consistent w/ pt's subjective reports of improvement   2. Pt will perform 8\" lateral tap downs in order to improve eccentric quad strength for squats    Status at last Eval: 4\" Current Status: 6\" progress   3. Pt will progress to next phase of protocol (initiate linear dynamic exercise)   Status at last Eval: n/a Current Status: n/a n/a     New Goals to be achieved in __4__  weeks:  1. Achieve LTG #1  2. Achieve LTG #2  3.  Pt will demo proper take-off/landing technique 6\" DL and SL box jumps up/down in order to progress towards running/return to sport    RECOMMENDATIONS  Recommend pt continue PT 1-2x/wk for additional 4 weeks in order to progress functional activity for progression towards return to sport. Thank you. If you have any questions/comments please contact us directly at [phone]. Thank you for allowing us to assist in the care of your patient. Therapist Signature: Joanie Martínez PT Date: 2/19/2018     Time: 6:22 PM   NOTE TO PHYSICIAN:  PLEASE COMPLETE THE ORDERS BELOW AND FAX TO   [facility]: [phone]  If you are unable to process this request in 24 hours please contact our office: [phone]    ___ I have read the above report and request that my patient continue as recommended.   ___ I have read the above report and request that my patient continue therapy with the following changes/special instructions:_________________________________________________________   ___ I have read the above report and request that my patient be discharged from therapy.      Physician Signature:        Date:       Time:

## 2018-02-19 NOTE — PROGRESS NOTES
PHYSICAL THERAPY - DAILY TREATMENT NOTE    Patient Name: Kristan Lorenzo        Date: 2018  : 1990   yes Patient  Verified  Visit #:     Insurance: Payor:  / Plan: BrianaGhassan Metcalf DEPENDENTS / Product Type:  /      In time: 5:20 Out time: 6:24   Total Treatment Time: 64     Medicare Time Tracking (below)   Total Timed Codes (min):  na 1:1 Treatment Time:  na     TREATMENT AREA =  Left knee pain [M25.562]    SUBJECTIVE  Pain Level (on 0 to 10 scale):  0  / 10   Medication Changes/New allergies or changes in medical history, any new surgeries or procedures?    no  If yes, update Summary List   Subjective Functional Status/Changes:  []  No changes reported     Pt reports she has cut down significantly on gym workouts outside of PT and feels her swelling has gone down as a result. OBJECTIVE  Modalities Rationale:     decrease inflammation to improve patient's ability to ambulate   min [] Estim, type/location:                                      []  att     []  unatt     []  w/US     []  w/ice    []  w/heat    min []  Mechanical Traction: type/lbs                   []  pro   []  sup   []  int   []  cont    []  before manual    []  after manual    min []  Ultrasound, settings/location:      min []  Iontophoresis w/ dexamethasone, location:                                               []  take home patch       []  in clinic   10 min [x]  Ice     []  Heat    location/position:  To the L knee in supine w/ bolster    min []  Vasopneumatic Device, press/temp:     min []  Other:    [x] Skin assessment post-treatment (if applicable):    []  intact    []  redness- no adverse reaction     []redness - adverse reaction:        44 min Therapeutic Exercise:  [x]  See flow sheet   Rationale:      increase ROM, increase strength, improve balance and increase proprioception to improve the patients ability to run/jump     10 min Manual Therapy: stm to the L bicep femoris, lateral gastroc, RF, VL, prone quad str   Rationale:      decrease pain, increase ROM, increase tissue extensibility and decrease trigger points to improve patient's ability to complete ADLs     min Patient Education:  yes  Reviewed HEP   []  Progressed/Changed HEP based on: Other Objective/Functional Measures:    Noted dec effusion to L knee this visit  Added eccentric DL HS curl on slide board  Pt demo proper alignment and good technique t/o STAR balance  Inc resistance per flow to improve strength/stability  ttp to distal patellar tendon     Post Treatment Pain Level (on 0 to 10) scale:   0  / 10     ASSESSMENT  Assessment/Changes in Function:     Pt demo compliance to activity modification recommendations given by PT, w/ improvement in effusion noted. Plan to progress therapeutic activity      []  See Progress Note/Recertification   Patient will continue to benefit from skilled PT services to modify and progress therapeutic interventions, address functional mobility deficits, address ROM deficits, address strength deficits, analyze and address soft tissue restrictions, analyze and cue movement patterns, address imbalance/dizziness and instruct in home and community integration to attain remaining goals.    Progress toward goals / Updated goals:    Plan to progress to DL box jumps at NV per protocol     PLAN  []  Upgrade activities as tolerated yes Continue plan of care   []  Discharge due to :    []  Other:      Therapist: Evelin Nassar, PT    Date: 2/19/2018 Time: 5:34 PM     Future Appointments  Date Time Provider Candido Mccarty   2/21/2018 4:30 PM DENISA [address]   2/27/2018 5:30 PM [address] [place] [address],  [address]   3/1/2018 5:30 PM Bibiana Strong PT [facility]   3/12/2018 5:30 PM Evelin Nassar PT [facility]   3/15/2018 5:30 PM Bibiana Strong PT [facility]

## 2018-02-21 ENCOUNTER — OFFICE VISIT (OUTPATIENT)
Dept: ORTHOPEDIC SURGERY | Age: 28
End: 2018-02-21

## 2018-02-21 VITALS
DIASTOLIC BLOOD PRESSURE: 84 MMHG | WEIGHT: 163.8 LBS | HEIGHT: 68 IN | HEART RATE: 67 BPM | BODY MASS INDEX: 24.83 KG/M2 | OXYGEN SATURATION: 100 % | SYSTOLIC BLOOD PRESSURE: 129 MMHG

## 2018-02-21 DIAGNOSIS — S83.512D RUPTURE OF ANTERIOR CRUCIATE LIGAMENT OF LEFT KNEE, SUBSEQUENT ENCOUNTER: Primary | ICD-10-CM

## 2018-02-21 NOTE — PROGRESS NOTES
Patient: Carlee Partida  YOB: 1990       HISTORY:  The patient presents for reevaluation of her left knee status post arthroscopic ACL reconstruction on 11/29/17. Patient is improved, states pain is a 0 out of 10.  she has gone to physical therapy and feels her ROM and strength are improving. she has received her sport brace and is doing well. Ready to start doing a little more in PT. Patient denies any fever, chills, chest pain, shortness of breath or calf pain. There are no new illness or injuries to report since last seen in the office. PHYSICAL EXAMINATION:    Visit Vitals    /84 (BP 1 Location: Left arm)    Pulse 67    Ht 5' 8\" (1.727 m)    Wt 163 lb 12.8 oz (74.3 kg)    SpO2 100%    BMI 24.91 kg/m2     The patient is a well-developed, well-nourished female in no acute distress. The patient is alert and oriented times three. The patient appears to be well groomed. Mood and affect are normal.   ORTHOPEDIC EXAM of Left knee: Inspection: no effusion  TTP: none  Range of motion: 0-120 flexion, straight leg raise with no lag  Stability: Stable  Strength: 5/5  2+ distal pulses  No laxity with ant drawer or lachmans    IMPRESSION:  Status post Left knee arthroscopic ACL reconstruction. PLAN:  Pt is doing well post operatively  Will cont with PT Ok to start working on some light jogging with sport brace. Will transition to HEP. Long discussion about her limitations was had.    OK to resume workouts at 6 month tess    RTC PRN        TAWNY [facility]

## 2018-02-27 ENCOUNTER — HOSPITAL ENCOUNTER (OUTPATIENT)
Dept: PHYSICAL THERAPY | Age: 28
Discharge: HOME | End: 2018-02-27
Payer: OTHER GOVERNMENT

## 2018-02-27 PROCEDURE — 97110 THERAPEUTIC EXERCISES: CPT | Performed by: PHYSICAL THERAPIST

## 2018-02-27 PROCEDURE — 97140 MANUAL THERAPY 1/> REGIONS: CPT | Performed by: PHYSICAL THERAPIST

## 2018-02-27 NOTE — PROGRESS NOTES
[facility]   PHYSICAL THERAPY - DAILY TREATMENT NOTE    Patient Name: Camila Villafana        Date: 2018  : 1990   yes Patient  Verified  Visit #:     Insurance: Payor:  / Plan: Ghassan Brush DEPENDENTS / Product Type:  /      In time: 530 Out time: 628   Total Treatment Time: 54     Medicare Time Tracking (below)   Total Timed Codes (min):  na 1:1 Treatment Time:  na     TREATMENT AREA =  Left knee pain [M25.562]    SUBJECTIVE  Pain Level (on 0 to 10 scale):  0  / 10   Medication Changes/New allergies or changes in medical history, any new surgeries or procedures?    no  If yes, update Summary List   Subjective Functional Status/Changes:  []  No changes reported       I saw the PA last week and she said that I was doing real well and cleared to start light jogging in PT.      OBJECTIVE  Modalities Rationale:     decrease edema, decrease inflammation, decrease pain and increase tissue extensibility to improve patient's ability to complete adls   min [] Estim, type/location:                                      []  att     []  unatt     []  w/US     []  w/ice    []  w/heat    min []  Mechanical Traction: type/lbs                   []  pro   []  sup   []  int   []  cont    []  before manual    []  after manual    min []  Ultrasound, settings/location:      min []  Iontophoresis w/ dexamethasone, location:                                               []  take home patch       []  in clinic   10 min [x]  Ice     []  Heat    location/position: Long sit    min []  Vasopneumatic Device, press/temp:     min []  Other:    [x] Skin assessment post-treatment (if applicable):    []  intact    [x]  redness- no adverse reaction     []redness - adverse reaction:        30 min Therapeutic Exercise:  [x]  See flow sheet   Rationale:      increase ROM, increase strength, improve coordination, improve balance and increase proprioception to improve the patients ability to return to sport     15 min Manual Therapy: stm anterior knee, quad dtm and stretch, prone quad stretch   Rationale:      decrease pain, increase ROM, increase tissue extensibility and decrease trigger points to improve patient's ability to complete adls       min Patient Education:  yes  Reviewed HEP   []  Progressed/Changed HEP based on: Other Objective/Functional Measures:    Pt brought in sports brace and performed all te as per flow sheet   Good form during eccentrically load of squats with equal weight distribution   Performed remaining LE push exercises without pain   Post Treatment Pain Level (on 0 to 10) scale:   0  / 10     ASSESSMENT  Assessment/Changes in Function:   Progressed to next phase of protocol to including linear dynamic exercises      []  See Progress Note/Recertification   Patient will continue to benefit from skilled PT services to modify and progress therapeutic interventions, address functional mobility deficits, address ROM deficits, address strength deficits, analyze and address soft tissue restrictions, analyze and cue movement patterns, analyze and modify body mechanics/ergonomics, assess and modify postural abnormalities, address imbalance/dizziness and instruct in home and community integration to attain remaining goals.    Progress toward goals / Updated goals:    Steady progress towards all established goals      PLAN  []  Upgrade activities as tolerated yes Continue plan of care   []  Discharge due to :    []  Other:      Therapist: Betsey Ernandez, PT    Date: 2/27/2018 Time: 1:05 PM     Future Appointments  Date Time Provider Candido Mccarty   3/1/2018 5:30 PM Betsey Ernandez, PT [facility]   3/12/2018 5:30 PM Ade Larson, PT [facility]   3/15/2018 5:30 PM Betsey Ernandez PT [facility]

## 2018-03-01 ENCOUNTER — HOSPITAL ENCOUNTER (OUTPATIENT)
Dept: PHYSICAL THERAPY | Age: 28
Discharge: HOME | End: 2018-03-01
Payer: OTHER GOVERNMENT

## 2018-03-01 PROCEDURE — 97140 MANUAL THERAPY 1/> REGIONS: CPT | Performed by: PHYSICAL THERAPIST

## 2018-03-01 PROCEDURE — 97110 THERAPEUTIC EXERCISES: CPT | Performed by: PHYSICAL THERAPIST

## 2018-03-01 NOTE — PROGRESS NOTES
Bloss[facility]   PHYSICAL THERAPY - DAILY TREATMENT NOTE    Patient Name: Paul Pineda        Date: 3/1/2018  : 1990   yes Patient  Verified  Visit #:     Insurance: Payor:  / Plan: Ghassan Brush DEPENDENTS / Product Type:  /      In time: 525 Out time: 625   Total Treatment Time: 61     Medicare Time Tracking (below)   Total Timed Codes (min):  na 1:1 Treatment Time:  na     TREATMENT AREA =  Left knee pain [M25.562]    SUBJECTIVE  Pain Level (on 0 to 10 scale):  0  / 10   Medication Changes/New allergies or changes in medical history, any new surgeries or procedures?    no  If yes, update Summary List   Subjective Functional Status/Changes:  []  No changes reported     I was a little sore in my groin area after last time but the knee felt fine       OBJECTIVE  Modalities Rationale:     decrease edema, decrease inflammation, decrease pain and increase tissue extensibility to improve patient's ability to complete adls   min [] Estim, type/location:                                      []  att     []  unatt     []  w/US     []  w/ice    []  w/heat    min []  Mechanical Traction: type/lbs                   []  pro   []  sup   []  int   []  cont    []  before manual    []  after manual    min []  Ultrasound, settings/location:      min []  Iontophoresis w/ dexamethasone, location:                                               []  take home patch       []  in clinic   10 min [x]  Ice     []  Heat    location/position: Long sit    min []  Vasopneumatic Device, press/temp:     min []  Other:    [x] Skin assessment post-treatment (if applicable):    []  intact    [x]  redness- no adverse reaction     []redness - adverse reaction:        35 min Therapeutic Exercise:  [x]  See flow sheet   Rationale:      increase ROM, increase strength, improve coordination, improve balance and increase proprioception to improve the patients ability to return to sport     15 min Manual Therapy: stm anterior knee, quad dtm and stretch, prone quad stretch   Rationale:      decrease pain, increase ROM, increase tissue extensibility and decrease trigger points to improve patient's ability to complete adls       min Patient Education:  yes  Reviewed HEP   []  Progressed/Changed HEP based on: Other Objective/Functional Measures:    Progressed box jumps to 12\" and good form   performed 75# rdl but pt would be able to progress this due to ease of completion  ttp along adductor with mt    Post Treatment Pain Level (on 0 to 10) scale:   0  / 10     ASSESSMENT  Assessment/Changes in Function:   Performed 1 LE push and 1 LE pull program this week in PT with no adverse reactions     []  See Progress Note/Recertification   Patient will continue to benefit from skilled PT services to modify and progress therapeutic interventions, address functional mobility deficits, address ROM deficits, address strength deficits, analyze and address soft tissue restrictions, analyze and cue movement patterns, analyze and modify body mechanics/ergonomics, assess and modify postural abnormalities, address imbalance/dizziness and instruct in home and community integration to attain remaining goals. Progress toward goals / Updated goals:    Despite non-compliance with NO LE exercise over the last 24 pt stated she would continue with advised hep.  Lengthy discussion with pt about approriate progression of exercise and protocol      PLAN  []  Upgrade activities as tolerated yes Continue plan of care   []  Discharge due to :    []  Other:      Therapist: Colt Pathak PT    Date: 3/1/2018 Time: 1:05 PM     Future Appointments  Date Time Provider Candido Mccarty   3/1/2018 5:30 PM Colt Pathak, JACQUELINE [facility]   3/12/2018 5:30 PM [address] [place] [address], PT [facility]   3/15/2018 5:30 PM Colt Pathak PT [facility]

## 2018-03-08 ENCOUNTER — HOSPITAL ENCOUNTER (OUTPATIENT)
Dept: PHYSICAL THERAPY | Age: 28
Discharge: HOME | End: 2018-03-08
Payer: OTHER GOVERNMENT

## 2018-03-08 PROCEDURE — 97140 MANUAL THERAPY 1/> REGIONS: CPT

## 2018-03-08 PROCEDURE — 97530 THERAPEUTIC ACTIVITIES: CPT

## 2018-03-08 PROCEDURE — 97110 THERAPEUTIC EXERCISES: CPT

## 2018-03-08 NOTE — PROGRESS NOTES
PHYSICAL THERAPY - DAILY TREATMENT NOTE    Patient Name: Rosalba Gallego        Date: 3/8/2018  : 1990   YES Patient  Verified  Visit #:     Insurance: Payor:  / Plan: Ghassan Brush DEPENDENTS / Product Type:  /      In time: 554 Out time: 653   Total Treatment Time: 61     Medicare Time Tracking (below)   Total Timed Codes (min):  na 1:1 Treatment Time:  na     TREATMENT AREA =  Left knee pain [M25.562]    SUBJECTIVE  Pain Level (on 0 to 10 scale):  0  / 10   Medication Changes/New allergies or changes in medical history, any new surgeries or procedures? NO    If yes, update Summary List   Subjective Functional Status/Changes:  []  No changes reported     Patient reports she aggravated her lower back deadlifting two weeks ago so she hasn't been working out consistently since this time. Patient denies experiencing any pain in her knee recently. OBJECTIVE  Modalities Rationale:     decrease inflammation and decrease pain to improve patient's ability to perform work activities.     min [] Estim, type/location:                                      []  att     []  unatt     []  w/US     []  w/ice    []  w/heat    min []  Mechanical Traction: type/lbs                   []  pro   []  sup   []  int   []  cont    []  before manual    []  after manual    min []  Ultrasound, settings/location:      min []  Iontophoresis w/ dexamethasone, location:                                               []  take home patch       []  in clinic   10 min [x]  Ice     []  Heat    location/position: To L knee in long sit    min []  Vasopneumatic Device, press/temp:     min []  Other:    [x] Skin assessment post-treatment (if applicable):    [x]  intact    []  redness- no adverse reaction     []redness - adverse reaction:        31 min Therapeutic Exercise:  [x]  See flow sheet   Rationale:      increase ROM and increase strength to improve the patients ability to perform exercise activities. 10 min Manual Therapy:    Rationale:      decrease pain, increase ROM, increase tissue extensibility and decrease trigger points to improve patient's ability to perform standing activities. 8 min Therapeutic Activity: DL to DL box jump 12 inch 2x5; DL to SL drop step from 6 inch box 2x5   Rationale:    improve coordination, improve balance and increase proprioception to improve the patients ability to perform athletic activities. min Patient Education:  YES  Reviewed HEP   []  Progressed/Changed HEP based on: Other Objective/Functional Measures:    Patient demonstrating appropriate back squat form with 65# this session  Patient presenting with altered landing mechanics during DL to SL drop step-trunk lean to L LE and increase knee valgus noted during first few repetitions, patient cued and able to correct by second set     Post Treatment Pain Level (on 0 to 10) scale:   0  / 10     ASSESSMENT  Assessment/Changes in Function:     Patient denied changes in symptoms post session. Patient advised she may perform back squat with appropriate weight and repetitions as done in clinic independently, but advised to not train her LE's more than 2 times per week. []  See Progress Note/Recertification   Patient will continue to benefit from skilled PT services to modify and progress therapeutic interventions, address functional mobility deficits, address ROM deficits, address strength deficits, analyze and address soft tissue restrictions, analyze and cue movement patterns, analyze and modify body mechanics/ergonomics and assess and modify postural abnormalities to attain remaining goals.    Progress toward goals / Updated goals:    Progressing toward new LTG#3     PLAN  [x]  Upgrade activities as tolerated YES Continue plan of care   []  Discharge due to :    []  Other:      Therapist: Herrera Hernandez PT    Date: 3/8/2018 Time: 6:50 PM     Future Appointments  Date Time Provider Candido Mccarty   3/12/2018 5:30 PM [address] [facility]   3/15/2018 5:30 PM JACQUELINE Robin [facility]

## 2018-03-12 ENCOUNTER — HOSPITAL ENCOUNTER (OUTPATIENT)
Dept: PHYSICAL THERAPY | Age: 28
Discharge: HOME | End: 2018-03-12
Payer: OTHER GOVERNMENT

## 2018-03-12 PROCEDURE — 97530 THERAPEUTIC ACTIVITIES: CPT

## 2018-03-12 PROCEDURE — 97140 MANUAL THERAPY 1/> REGIONS: CPT

## 2018-03-12 PROCEDURE — 97110 THERAPEUTIC EXERCISES: CPT

## 2018-03-12 NOTE — PROGRESS NOTES
PHYSICAL THERAPY - DAILY TREATMENT NOTE    Patient Name: Aric Ham        Date: 3/12/2018  : 1990   yes Patient  Verified  Visit #:     Insurance: Payor:  / Plan: Ghassan Brush DEPENDENTS / Product Type:  /      In time: 5:37 Out time: 6:35   Total Treatment Time: 62     Medicare Time Tracking (below)   Total Timed Codes (min):  na 1:1 Treatment Time:  na     TREATMENT AREA =  Left knee pain [M25.562]    SUBJECTIVE  Pain Level (on 0 to 10 scale):  0  / 10   Medication Changes/New allergies or changes in medical history, any new surgeries or procedures?    no  If yes, update Summary List   Subjective Functional Status/Changes:  []  No changes reported     Pt reports she has not been back in the gym since last visit but reports her knee has been feeling good.           OBJECTIVE  Modalities Rationale:     decrease inflammation and decrease pain to improve patient's ability to ambulate   min [] Estim, type/location:                                      []  att     []  unatt     []  w/US     []  w/ice    []  w/heat    min []  Mechanical Traction: type/lbs                   []  pro   []  sup   []  int   []  cont    []  before manual    []  after manual    min []  Ultrasound, settings/location:      min []  Iontophoresis w/ dexamethasone, location:                                               []  take home patch       []  in clinic   10 min [x]  Ice     []  Heat    location/position: To L knee in supine w/bolster    min []  Vasopneumatic Device, press/temp:     min []  Other:    [x] Skin assessment post-treatment (if applicable):    [x]  intact    []  redness- no adverse reaction     []redness - adverse reaction:        28 min Therapeutic Exercise:  [x]  See flow sheet   Rationale:      increase strength, improve coordination, improve balance and increase proprioception to improve the patients ability to return to running, jumping     10 min Manual Therapy: stm to the L quad, hs, prone quad str, supine hs str   Rationale:      decrease pain, increase ROM, increase tissue extensibility and decrease trigger points to improve patient's ability to complete ADLs    10 min Therapeutic Activity: DL to DL 12\" up/down, DL to SL up 6\", SL drop step 2x5   Rationale:    increase strength, improve coordination and increase proprioception to improve the patients ability to complete ADLs     min Patient Education:  yes  Reviewed HEP   []  Progressed/Changed HEP based on: Other Objective/Functional Measures:    Pt apprehensive to perform DL to SL up to 6\", requiring hand held assist for balance 3x able to perform remaining 2 reps w/o hha w/ proper LE alignment  Demo symmetrical weight shift front squat however demo excessive hip hinge/trunk lean     Post Treatment Pain Level (on 0 to 10) scale:   0  / 10     ASSESSMENT  Assessment/Changes in Function:     Pt w/ good tolerance to today's treatment (LE pull). Cont to demo posterior chain weakness to be progressed as tolerated. Plan to progress to walk:jog interval at NV (pt reported inc fatigue today and declined to initiate)     []  See Progress Note/Recertification   Patient will continue to benefit from skilled PT services to modify and progress therapeutic interventions, address functional mobility deficits, address ROM deficits, address strength deficits, analyze and address soft tissue restrictions, analyze and cue movement patterns, analyze and modify body mechanics/ergonomics, assess and modify postural abnormalities and instruct in home and community integration to attain remaining goals.    Progress toward goals / Updated goals:    Progressing to LTG #3 well     PLAN  []  Upgrade activities as tolerated yes Continue plan of care   []  Discharge due to :    []  Other:      Therapist: Briana Lanza, PT    Date: 3/12/2018 Time: 5:51 PM     Future Appointments  Date Time Provider Candido cMcarty   3/15/2018 5:30 PM Jeanne Barbra Espitia, PT [facility]

## 2018-03-15 ENCOUNTER — HOSPITAL ENCOUNTER (OUTPATIENT)
Dept: PHYSICAL THERAPY | Age: 28
Discharge: HOME | End: 2018-03-15
Payer: OTHER GOVERNMENT

## 2018-03-15 PROCEDURE — 97140 MANUAL THERAPY 1/> REGIONS: CPT | Performed by: PHYSICAL THERAPIST

## 2018-03-15 PROCEDURE — 97110 THERAPEUTIC EXERCISES: CPT | Performed by: PHYSICAL THERAPIST

## 2018-03-15 NOTE — PROGRESS NOTES
Sangeeta Rudd   PHYSICAL THERAPY - DAILY TREATMENT NOTE    Patient Name: Fior Mckinley        Date: 3/15/2018  : 1990   yes Patient  Verified  Visit #:     Insurance: Payor:  / Plan: Ghassan Brush DEPENDENTS / Product Type:  /      In time: 530 Out time: 640   Total Treatment Time: 72     Medicare Time Tracking (below)   Total Timed Codes (min):  na 1:1 Treatment Time:  na     TREATMENT AREA =  Left knee pain [M25.562]    SUBJECTIVE  Pain Level (on 0 to 10 scale):  0  / 10   Medication Changes/New allergies or changes in medical history, any new surgeries or procedures?    no  If yes, update Summary List   Subjective Functional Status/Changes:  []  No changes reported     See pn       OBJECTIVE  Modalities Rationale:     decrease edema, decrease inflammation, decrease pain and increase tissue extensibility to improve patient's ability to complete adls   min [] Estim, type/location:                                      []  att     []  unatt     []  w/US     []  w/ice    []  w/heat    min []  Mechanical Traction: type/lbs                   []  pro   []  sup   []  int   []  cont    []  before manual    []  after manual    min []  Ultrasound, settings/location:      min []  Iontophoresis w/ dexamethasone, location:                                               []  take home patch       []  in clinic   10 min [x]  Ice     []  Heat    location/position: Long sit    min []  Vasopneumatic Device, press/temp:     min []  Other:    [x] Skin assessment post-treatment (if applicable):    []  intact    [x]  redness- no adverse reaction     []redness - adverse reaction:        40 min Therapeutic Exercise:  [x]  See flow sheet   Rationale:      increase ROM, increase strength, improve coordination, improve balance and increase proprioception to improve the patients ability to return to sport     15 min Manual Therapy: stm anterior knee, quad dtm and stretch, prone quad stretch Rationale:      decrease pain, increase ROM, increase tissue extensibility and decrease trigger points to improve patient's ability to complete adls       min Patient Education:  yes  Reviewed HEP   []  Progressed/Changed HEP based on: Other Objective/Functional Measures:    See pn   Post Treatment Pain Level (on 0 to 10) scale:   0  / 10     ASSESSMENT  Assessment/Changes in Function:     See pn   []  See Progress Note/Recertification   Patient will continue to benefit from skilled PT services to modify and progress therapeutic interventions, address functional mobility deficits, address ROM deficits, address strength deficits, analyze and address soft tissue restrictions, analyze and cue movement patterns, analyze and modify body mechanics/ergonomics, assess and modify postural abnormalities, address imbalance/dizziness and instruct in home and community integration to attain remaining goals.    Progress toward goals / Updated goals:    See pn     PLAN  []  Upgrade activities as tolerated yes Continue plan of care   []  Discharge due to :    []  Other:      Therapist: Jaime Butts PT    Date: 3/15/2018 Time: 1:05 PM     Future Appointments  Date Time Provider Candido Mccarty   3/15/2018 5:30 PM Jaime Butts, JACQUELINE [facility]

## 2018-03-27 ENCOUNTER — HOSPITAL ENCOUNTER (OUTPATIENT)
Dept: PHYSICAL THERAPY | Age: 28
Discharge: HOME | End: 2018-03-27
Payer: OTHER GOVERNMENT

## 2018-03-27 PROCEDURE — 97110 THERAPEUTIC EXERCISES: CPT | Performed by: PHYSICAL THERAPIST

## 2018-03-27 PROCEDURE — 97140 MANUAL THERAPY 1/> REGIONS: CPT | Performed by: PHYSICAL THERAPIST

## 2018-03-27 NOTE — PROGRESS NOTES
[facility] PHYSICAL THERAPY  [address] [place] [address] Angela Aguilar [facility] [address],[place], [facility] - Phone: [phone]  Fax: [phone]  PROGRESS NOTE  Patient Name: Gualberto Jules : 1990   Treatment/Medical Diagnosis: Left knee pain [M25.562]   Referral Source: Larisa Crane,*     Date of Initial Visit: 17 Attended Visits: 24 Missed Visits: 0     SUMMARY OF TREATMENT  Pt has attended 24 sessions of PT s/p L ACLR. PT tx has consisted of therex, manual therapy (prom/mobs/stm) modalities, and HEP in order to improve L knee ROM, LE strength/stability, balance/propriception, dec pain, and improve functional mobility. CURRENT STATUS  Pt has made excellent gains with PT. She has demonstrated good progress with eccentric loading during box jumps and therefore progressed her to linear running this session. She was extremely apprehensive which I believe contributed more to flexed gait vs knee stability. She has two remaining visits scheduled and then an unrelated medical procedure in which she will be unable to participate in PT. Would benefit form attendance to progress running. Goal/Measure of Progress Goal Met? 1. Pt will improve FOTO score to >/= 60/100 to indicate function   Status at last Eval: 48/100 Current Status: 60/100 yes   2. Pt will demo proper take-off/landing technique 6\" DL and SL box jumps up/down in order to progress towards running/return to sport   Status at last Eval: N/a  Current Status:  yes   3. Pt will progress to next phase of protocol (initiate linear dynamic exercise)   Status at last Eval: n/a Current Status: See above yes     New Goals to be achieved in __4__  weeks:  1. Pt will return to running  2. Pt will be I with high level hep in order to prepare for dc   RECOMMENDATIONS  Recommend pt continue PT 1x/wk for additional 2 weeks in order to progress functional activity for progression towards return to sport. Thank you.   If you have any questions/comments please contact us directly at [phone]. Thank you for allowing us to assist in the care of your patient. Therapist Signature: Jaime Butts PT, DPT, CMT  Date: 3/27/2018     Time: 6:22 PM   NOTE TO PHYSICIAN:  PLEASE COMPLETE THE ORDERS BELOW AND FAX TO   [facility] Physical [facility]: [phone]  If you are unable to process this request in 24 hours please contact our office: [phone]    ___ I have read the above report and request that my patient continue as recommended.   ___ I have read the above report and request that my patient continue therapy with the following changes/special instructions:_________________________________________________________   ___ I have read the above report and request that my patient be discharged from therapy.      Physician Signature:        Date:       Time:

## 2018-03-27 NOTE — PROGRESS NOTES
Becca Barrett   PHYSICAL THERAPY - DAILY TREATMENT NOTE    Patient Name: Cong Guardado        Date: 3/27/2018  : 1990   yes Patient  Verified  Visit #:     Insurance: Payor:  / Plan: Marquis Stein [address] / Product Type:  /      In time: 530 Out time: 620   Total Treatment Time: 50     Medicare Time Tracking (below)   Total Timed Codes (min):  na 1:1 Treatment Time:  na     TREATMENT AREA =  Left knee pain [M25.562]    SUBJECTIVE  Pain Level (on 0 to 10 scale):  0  / 10   Medication Changes/New allergies or changes in medical history, any new surgeries or procedures?    no  If yes, update Summary List   Subjective Functional Status/Changes:  []  No changes reported     I went running on my own since the last time both inside and outside and it went without any pain     OBJECTIVE  Modalities Rationale:     decrease edema, decrease inflammation, decrease pain and increase tissue extensibility to improve patient's ability to complete adls   min [] Estim, type/location:                                      []  att     []  unatt     []  w/US     []  w/ice    []  w/heat    min []  Mechanical Traction: type/lbs                   []  pro   []  sup   []  int   []  cont    []  before manual    []  after manual    min []  Ultrasound, settings/location:      min []  Iontophoresis w/ dexamethasone, location:                                               []  take home patch       []  in clinic   10 min [x]  Ice     []  Heat    location/position: Long sit    min []  Vasopneumatic Device, press/temp:     min []  Other:    [x] Skin assessment post-treatment (if applicable):    []  intact    [x]  redness- no adverse reaction     []redness - adverse reaction:        30 min Therapeutic Exercise:  [x]  See flow sheet   Rationale:      increase ROM, increase strength, improve coordination, improve balance and increase proprioception to improve the patients ability to return to sport     10 min Manual Therapy: stm anterior knee, quad dtm and stretch, prone quad stretch   Rationale:      decrease pain, increase ROM, increase tissue extensibility and decrease trigger points to improve patient's ability to complete adls       min Patient Education:  yes  Reviewed HEP   []  Progressed/Changed HEP based on: Other Objective/Functional Measures:  ttp along pes anserine without pain at end range   Ran 30\" on/off with good form at 6.0. Increased exercises as per flow sheet without pain    Post Treatment Pain Level (on 0 to 10) scale:   0  / 10     ASSESSMENT  Assessment/Changes in Function:   Continues with self progression of exercises at appropriate rate    []  See Progress Note/Recertification   Patient will continue to benefit from skilled PT services to modify and progress therapeutic interventions, address functional mobility deficits, address ROM deficits, address strength deficits, analyze and address soft tissue restrictions, analyze and cue movement patterns, analyze and modify body mechanics/ergonomics, assess and modify postural abnormalities, address imbalance/dizziness and instruct in home and community integration to attain remaining goals. Progress toward goals / Updated goals:    Pt to have medical procedure next week which will limit exercise progression/participaiton in PT.  She was advised to contact MD in regards to clearance for activity     PLAN  []  Upgrade activities as tolerated yes Continue plan of care   []  Discharge due to :    []  Other:      Therapist: Shiloh Feldman PT    Date: 3/27/2018 Time: 1:05 PM     Future Appointments  Date Time Provider Candido Mccarty   3/27/2018 5:30 PM Shiloh Feldman PT [facility]   4/3/2018 5:00 PM [address] [place] [address],  O [address]

## 2018-04-03 ENCOUNTER — HOSPITAL ENCOUNTER (OUTPATIENT)
Dept: PHYSICAL THERAPY | Age: 28
Discharge: HOME | End: 2018-04-03
Payer: OTHER GOVERNMENT

## 2018-04-03 PROCEDURE — 97140 MANUAL THERAPY 1/> REGIONS: CPT | Performed by: PHYSICAL THERAPIST

## 2018-04-03 PROCEDURE — 97110 THERAPEUTIC EXERCISES: CPT | Performed by: PHYSICAL THERAPIST

## 2018-04-03 NOTE — PROGRESS NOTES
[facility] PHYSICAL THERAP[facility] - DAILY TREATMENT NOTE    Patient Name: Tae Cadet        Date: 4/3/2018  : 1990   yes Patient  Verified  Visit #:   32   of     Insurance: Payor:  / Plan: Dang Barrera / Product Type:  /      In time: 500 Out time: 550   Total Treatment Time: 50     Medicare Time Tracking (below)   Total Timed Codes (min):  na 1:1 Treatment Time:  na     TREATMENT AREA =  Left knee pain [M25.562]    SUBJECTIVE  Pain Level (on 0 to 10 scale):  0  / 10   Medication Changes/New allergies or changes in medical history, any new surgeries or procedures?    no  If yes, update Summary List   Subjective Functional Status/Changes:  []  No changes reported     I have been running on my own at the gym and it is going well      OBJECTIVE    35 min Therapeutic Exercise:  [x]  See flow sheet   Rationale:      increase ROM, increase strength, improve coordination, improve balance and increase proprioception to improve the patients ability to return to sport     15 min Manual Therapy: stm anterior knee, quad dtm and stretch, prone quad stretch   Rationale:      decrease pain, increase ROM, increase tissue extensibility and decrease trigger points to improve patient's ability to complete adls       min Patient Education:  yes  Reviewed HEP   []  Progressed/Changed HEP based on:        Other Objective/Functional Measures:  Reported tenderness along medial compartment of knee and adductors  Good form with all te even with progression of gm resistance bands    Post Treatment Pain Level (on 0 to 10) scale:   0  / 10     ASSESSMENT  Assessment/Changes in Function:     Pt demonstrates good eccentric quad control with all jumping/running   []  See Progress Note/Recertification   Patient will continue to benefit from skilled PT services to modify and progress therapeutic interventions, address functional mobility deficits, address ROM deficits, address strength deficits, analyze and address soft tissue restrictions, analyze and cue movement patterns, analyze and modify body mechanics/ergonomics, assess and modify postural abnormalities, address imbalance/dizziness and instruct in home and community integration to attain remaining goals. Progress toward goals / Updated goals:    Pt with pending surgery and advised to contact their office and obtain a post op protocol to include all exercises she is able to participate in to develop a hep over the next month. Pt as also advised it if is >30 days since last visit she will need a new referral from md     PLAN  []  Upgrade activities as tolerated yes Continue plan of care   []  Discharge due to :    []  Other:      Therapist: Delphine Murillo, PT    Date: 4/3/2018 Time: 1:05 PM     No future appointments.

## 2018-04-25 ENCOUNTER — HOSPITAL ENCOUNTER (OUTPATIENT)
Dept: PHYSICAL THERAPY | Age: 28
Discharge: HOME | End: 2018-04-25
Payer: OTHER GOVERNMENT

## 2018-04-25 PROCEDURE — 97140 MANUAL THERAPY 1/> REGIONS: CPT

## 2018-04-25 PROCEDURE — 97110 THERAPEUTIC EXERCISES: CPT

## 2018-04-25 NOTE — PROGRESS NOTES
PHYSICAL THERAPY - DAILY TREATMENT NOTE    Patient Name: Kody Salvage        Date: 2018  : 1990   yes Patient  Verified  Visit #:   32   of   28  Insurance: Payor: KIMO / Plan: Marquis Stein 74 / Product Type:  /      In time: 5:50 Out time: 6:37   Total Treatment Time: 52     Medicare Time Tracking (below)   Total Timed Codes (min):  na 1:1 Treatment Time:  na     TREATMENT AREA =  Left knee pain [M25.562]    SUBJECTIVE  Pain Level (on 0 to 10 scale):  0  / 10   Medication Changes/New allergies or changes in medical history, any new surgeries or procedures?    no  If yes, update Summary List   Subjective Functional Status/Changes:  []  No changes reported     Pt recently underwent breast augmentation surgery 3 weeks ago; has been cleared to resume exercise avoiding carrying/lifting weights and any pec activation; avoid running and plyometric activity untl 6 weeks post op. Pt reports she did some body weight squats, leg press, leg extensions, and leg curls 2x in the gym. Reports some discomfort to patellar tendon and fatigue to quad/hs. Noted clicking sensation posterior knee when ascending stairs but denies pain. OBJECTIVE  Modalities Rationale:     decrease inflammation to improve patient's ability to ambulate, transfer, stand   min [] Estim, type/location:                                      []  att     []  unatt     []  w/US     []  w/ice    []  w/heat    min []  Mechanical Traction: type/lbs                   []  pro   []  sup   []  int   []  cont    []  before manual    []  after manual    min []  Ultrasound, settings/location:      min []  Iontophoresis w/ dexamethasone, location:                                               []  take home patch       []  in clinic   10 min [x]  Ice     []  Heat    location/position:  To the L knee in long sit    min []  Vasopneumatic Device, press/temp:     min []  Other:    [x] Skin assessment post-treatment (if applicable): [x]  intact    []  redness- no adverse reaction     []redness - adverse reaction:        29 min Therapeutic Exercise:  [x]  See flow sheet   Rationale:      increase strength to improve the patients ability to return to sport     8 min Manual Therapy: Stm to L RF, distal HS, popliteus, cfm to patellar tendon, prone quad str   Rationale:      decrease pain, increase ROM, increase tissue extensibility and decrease trigger points to improve patient's ability to squat       min Patient Education:  yes  Reviewed HEP   []  Progressed/Changed HEP based on: Other Objective/Functional Measures:    Resumed body weight and axial loaded lower-body push this visit (adhereing to post-op restrictions)  Pt demo proper technique w/ all exercise, denied pain  AROM knee flexion 140 deg     Post Treatment Pain Level (on 0 to 10) scale:   0  / 10     ASSESSMENT  Assessment/Changes in Function:     Will continue PT alternating push/pull days, adhering to post-op restrictions until 6 weeks post op to maintain strength/stability of L knee; plan to resume running/plyometric after post-op period in order to return to sport     []  See Progress Note/Recertification   Patient will continue to benefit from skilled PT services to modify and progress therapeutic interventions, address functional mobility deficits, address strength deficits, analyze and address soft tissue restrictions, analyze and cue movement patterns, analyze and modify body mechanics/ergonomics and instruct in home and community integration to attain remaining goals.    Progress toward goals / Updated goals:    See PN     PLAN  []  Upgrade activities as tolerated yes Continue plan of care   []  Discharge due to :    []  Other:      Therapist: Neo Hall PT    Date: 4/25/2018 Time: 5:34 PM     Future Appointments  Date Time Provider Candido Mccarty   4/25/2018 6:00 PM Neo Hall PT [facility]   4/30/2018 8:30 AM Yannick Boyce, PT [facility]

## 2018-04-30 ENCOUNTER — HOSPITAL ENCOUNTER (OUTPATIENT)
Dept: PHYSICAL THERAPY | Age: 28
Discharge: HOME | End: 2018-04-30
Payer: OTHER GOVERNMENT

## 2018-04-30 PROCEDURE — 97110 THERAPEUTIC EXERCISES: CPT | Performed by: PHYSICAL THERAPIST

## 2018-04-30 PROCEDURE — 97140 MANUAL THERAPY 1/> REGIONS: CPT | Performed by: PHYSICAL THERAPIST

## 2018-04-30 NOTE — PROGRESS NOTES
[facility] PHYSICAL THERAP[facility] - DAILY TREATMENT NOTE    Patient Name: Stan Mayorga        Date: 2018  : 1990   yes Patient  Verified  Visit #:     Insurance: Payor: KIMO / Plan: Marquis Stein 74 / Product Type:  /      In time: 830 Out time: 886   Total Treatment Time: 62     Medicare Time Tracking (below)   Total Timed Codes (min):  na 1:1 Treatment Time:  na     TREATMENT AREA =  Left knee pain [M25.562]    SUBJECTIVE  Pain Level (on 0 to 10 scale):  0  / 10   Medication Changes/New allergies or changes in medical history, any new surgeries or procedures?    no  If yes, update Summary List   Subjective Functional Status/Changes:  []  No changes reported     Knee is feeling good after last time - I even went back to the gym and did the squats (someone put the weight on for me) without pain - I do feel weak though           OBJECTIVE  Modalities Rationale:     decrease inflammation, decrease pain and increase tissue extensibility to improve patient's ability to deep squat   min [] Estim, type/location:                                      []  att     []  unatt     []  w/US     []  w/ice    []  w/heat    min []  Mechanical Traction: type/lbs                   []  pro   []  sup   []  int   []  cont    []  before manual    []  after manual    min []  Ultrasound, settings/location:      min []  Iontophoresis w/ dexamethasone, location:                                               []  take home patch       []  in clinic   10 min [x]  Ice     []  Heat    location/position: Supine with bolster     min []  Vasopneumatic Device, press/temp:     min []  Other:    [x] Skin assessment post-treatment (if applicable):    [x]  intact    []  redness- no adverse reaction     []redness - adverse reaction:        32 min Therapeutic Exercise:  [x]  See flow sheet   Rationale:      increase ROM, increase strength, improve coordination, improve balance and increase proprioception to improve the patients ability to return to sport     20 min Manual Therapy: Dtm/release/stretch quad, cfm incision, hs and gastroc stretch    Rationale:      decrease pain, increase ROM, increase tissue extensibility, decrease edema  and decrease trigger points to improve patient's ability to return to sport        min Patient Education:  yes  Reviewed HEP   []  Progressed/Changed HEP based on: Other Objective/Functional Measures:    ttp along tibial incision with noted redness at proximal aspect (no visible stiches, swelling or any red flags)  Performed LE pull exercises without UE involvement  Required brace off for full kneel hip hinge      Post Treatment Pain Level (on 0 to 10) scale:   0  / 10     ASSESSMENT  Assessment/Changes in Function:     No increase in pain with new te and advised on exercise progression at gym and demo verbal understanding      []  See Progress Note/Recertification   Patient will continue to benefit from skilled PT services to modify and progress therapeutic interventions, address functional mobility deficits, address ROM deficits, address strength deficits, analyze and address soft tissue restrictions, analyze and cue movement patterns, analyze and modify body mechanics/ergonomics, assess and modify postural abnormalities and instruct in home and community integration to attain remaining goals. Progress toward goals / Updated goals: Will resume  PT an additional month 1x/week to progress strength and progress towards newly established goal      PLAN  []  Upgrade activities as tolerated yes Continue plan of care   []  Discharge due to :    []  Other:      Therapist: Zully Knight PT    Date: 4/30/2018 Time: 10:03 AM     No future appointments.

## 2018-05-17 ENCOUNTER — HOSPITAL ENCOUNTER (OUTPATIENT)
Dept: PHYSICAL THERAPY | Age: 28
Discharge: HOME | End: 2018-05-17
Payer: OTHER GOVERNMENT

## 2018-05-17 PROCEDURE — 97110 THERAPEUTIC EXERCISES: CPT

## 2018-05-17 NOTE — PROGRESS NOTES
PHYSICAL THERAPY - DAILY TREATMENT NOTE    Patient Name: Norm Obrien        Date: 2018  : 1990   YES Patient  Verified  Visit #:   34   of   28  Insurance: Payor: KIMO / Plan: Marquis Stein 74 / Product Type:  /      In time: 548 Out time: 647   Total Treatment Time: 61     Medicare Time Tracking (below)   Total Timed Codes (min):  na 1:1 Treatment Time:  na     TREATMENT AREA =  Left knee pain [M25.562]    SUBJECTIVE  Pain Level (on 0 to 10 scale):  0  / 10   Medication Changes/New allergies or changes in medical history, any new surgeries or procedures? NO    If yes, update Summary List   Subjective Functional Status/Changes:  []  No changes reported     Patient states she has been doing well since her last visit on 18. Patient has been exercising and running on a occasion without significant complication. Patient did notice some front knee pain which she related to using the leg extension machine. Patient also asks when she will be ready to resume full participation with volleyball activities           OBJECTIVE  Modalities Rationale:     decrease inflammation and decrease pain to improve patient's ability to perform work activities.     min [] Estim, type/location:                                      []  att     []  unatt     []  w/US     []  w/ice    []  w/heat    min []  Mechanical Traction: type/lbs                   []  pro   []  sup   []  int   []  cont    []  before manual    []  after manual    min []  Ultrasound, settings/location:      min []  Iontophoresis w/ dexamethasone, location:                                               []  take home patch       []  in clinic   10 min [x]  Ice     []  Heat    location/position: To L knee in long sit    min []  Vasopneumatic Device, press/temp:     min []  Other:    [x] Skin assessment post-treatment (if applicable):    [x]  intact    []  redness- no adverse reaction     []redness - adverse reaction:        49 min Therapeutic Exercise:  [x]  See flow sheet   Rationale:      increase ROM, increase strength, improve coordination and increase proprioception to improve the patients ability to perform athletic activities. min Patient Education:  YES  Reviewed HEP   []  Progressed/Changed HEP based on: Other Objective/Functional Measures:    Progressed plyometric program as follows: DL to DL box jump 2x5 (18\"), DL to SL box jump 2x5 (6\"), DL to SL lateral box jump 2x5 (6\"), drop step from 6 inch box to yesica jump (6\", 12\") x5 each, SL drop step from 6 inch box to lateral jump 2x5/side, DL broad jump 2x5, SL broad jump 2x5. Patient demonstrating reduce force production and confidence with L SL jumping and landing activities this session. Patient does demonstrate proper jumping and landing mechanics even with increased fatigue     Post Treatment Pain Level (on 0 to 10) scale:   0  / 10     ASSESSMENT  Assessment/Changes in Function:     Patient educated on return to sport criteria and time frame from surgical procedure. Patient instructed on performing new plyometric exercises independently and will continue to progress as appropriate in PT. Patient acknowledged understanding. []  See Progress Note/Recertification   Patient will continue to benefit from skilled PT services to modify and progress therapeutic interventions, address functional mobility deficits, address ROM deficits, address strength deficits, analyze and address soft tissue restrictions, analyze and cue movement patterns, analyze and modify body mechanics/ergonomics and assess and modify postural abnormalities to attain remaining goals.    Progress toward goals / Updated goals:    Progressing with LTG#1 per subjective reports     PLAN  []  Upgrade activities as tolerated YES Continue plan of care   []  Discharge due to :    []  Other:      Therapist: Skylar Lopez, PT    Date: 5/17/2018 Time: 6:52 PM     Future Appointments  Date Time Provider Candido Mccarty   5/24/2018 5:30 PM [address], [address] [facility]   6/5/2018 5:30 PM [address], [address] [place] [address]   6/7/2018 5:30 PM Naomi Albarran, PT [facility]   6/11/2018 6:00 PM Andrew Forward, PT [facility]   6/13/2018 6:00 PM Andrew Forward, PT [facility]

## 2018-05-22 ENCOUNTER — HOSPITAL ENCOUNTER (OUTPATIENT)
Dept: PHYSICAL THERAPY | Age: 28
Discharge: HOME | End: 2018-05-22
Payer: OTHER GOVERNMENT

## 2018-05-22 PROCEDURE — 97110 THERAPEUTIC EXERCISES: CPT | Performed by: PHYSICAL THERAPIST

## 2018-05-22 PROCEDURE — 97530 THERAPEUTIC ACTIVITIES: CPT | Performed by: PHYSICAL THERAPIST

## 2018-05-22 PROCEDURE — 97140 MANUAL THERAPY 1/> REGIONS: CPT | Performed by: PHYSICAL THERAPIST

## 2018-05-22 NOTE — PROGRESS NOTES
[facility]   PHYSICAL THERAPY - DAILY TREATMENT NOTE    Patient Name: Brayden Deras        Date: 2018  : 1990   yes Patient  Verified  Visit #:   27   of   39  Insurance: Payor:  / Plan: Marquis Stein 74 / Product Type:  /      In time: 458 Out time: 602   Total Treatment Time: 60     Medicare Time Tracking (below)   Total Timed Codes (min):  na 1:1 Treatment Time:  na     TREATMENT AREA =  Left knee pain [M25.562]    SUBJECTIVE  Pain Level (on 0 to 10 scale):  0  / 10   Medication Changes/New allergies or changes in medical history, any new surgeries or procedures?    no  If yes, update Summary List   Subjective Functional Status/Changes:  []  No changes reported     See pn          OBJECTIVE  Modalities Rationale:     decrease inflammation, decrease pain and increase tissue extensibility to improve patient's ability to complete adls   min [] Estim, type/location:                                      []  att     []  unatt     []  w/US     []  w/ice    []  w/heat    min []  Mechanical Traction: type/lbs                   []  pro   []  sup   []  int   []  cont    []  before manual    []  after manual    min []  Ultrasound, settings/location:      min []  Iontophoresis w/ dexamethasone, location:                                               []  take home patch       []  in clinic   10 min [x]  Ice     []  Heat    location/position: Long sit     min []  Vasopneumatic Device, press/temp:     min []  Other:    [x] Skin assessment post-treatment (if applicable):    []  intact    [x]  redness- no adverse reaction     []redness - adverse reaction:        10 min Therapeutic Exercise:  [x]  See flow sheet   Rationale:      increase strength, improve coordination, improve balance and increase proprioception to improve the patients ability to complete adls     15 min Manual Therapy: Tibial distraction and er, quad stretch and release   Rationale:      decrease pain, increase ROM, increase tissue extensibility and decrease edema  to improve patient's ability to run    25 min Therapeutic Activity: See chart    Rationale:    increase ROM, increase strength, improve coordination, improve balance and increase proprioception to improve the patients ability to return to sport          min Patient Education:  yes  Reviewed HEP   []  Progressed/Changed HEP based on: Other Objective/Functional Measures:    See pn     Post Treatment Pain Level (on 0 to 10) scale:   0  / 10     ASSESSMENT  Assessment/Changes in Function:     See pn     []  See Progress Note/Recertification   Patient will continue to benefit from skilled PT services to modify and progress therapeutic interventions, address functional mobility deficits, address strength deficits, analyze and address soft tissue restrictions, analyze and cue movement patterns, analyze and modify body mechanics/ergonomics, assess and modify postural abnormalities and instruct in home and community integration to attain remaining goals.    Progress toward goals / Updated goals:    See pn     PLAN  []  Upgrade activities as tolerated yes Continue plan of care   []  Discharge due to :    []  Other:      Therapist: Jennifer Iniguez PT    Date: 5/22/2018 Time: 4:50 PM     Future Appointments  Date Time Provider Candido Mccarty   5/22/2018 5:00 PM Jennifer Iniguez PT [facility]   5/24/2018 5:30 PM [address] [place] [address], [address] [place] [address]   6/5/2018 5:30 PM [address] [place] [address], [address] [place] [address]   6/7/2018 5:30 PM [address] [place] [address], [address] [place] [address]   6/11/2018 6:00 PM Burnell Cowden, PT [facility]   6/13/2018 6:00 PM Burnell Cowden, PT [facility]

## 2018-05-23 NOTE — PROGRESS NOTES
.[facility] [address] THERAPY  [address] [place] [address] Jeff, [facility],Nevaeh Yates, [address] [facility] - Phone: [phone]  Fax: [phone]  PROGRESS NOTE  Patient Name: Ralf Pride : 1990   Treatment/Medical Diagnosis: Left knee pain [M25.562]   Referral Source: Sally Dahl,*     Date of Initial Visit: 17 Attended Visits: 30 Missed Visits: See below     SUMMARY OF TREATMENT  Pt has attended 29 sessions of PT s/p L ACLR. PT tx has consisted of therex, manual therapy (prom/mobs/stm) modalities, and HEP in order to improve L knee ROM, LE strength/stability, balance/propriception, dec pain, and improve functional mobility. CURRENT STATUS  She has missed approximately one month of therapy due to unrelated medical procedure. She was unable to perform any dynamic exercises (ruuning/jumping). She is approximately 6 months post op and is able to run in linear direction without complication and symmetrical pattern. She has difficulty with single leg eccentric control and push off/power with jumping. She is able to cut in one direction pain free but again with decreased power. Would like to progress to the 6 month protocol including sports related drills with anticipation of d/c at that time    Goal/Measure of Progress Goal Met? 1. Pt will return to running   Status at last Eval: Unable to run in the last 3 weeks; not assessed; will resume at 6 weeks post op (~) Current Status: Up to 2 miles pain free with symmetrical gait yes   2. Pt will be I with high level hep in order ot prepare for    Status at last Eval: Still requires cues Current Status: Still requires cues progressing   3. Status at last Eval:  Current Status:       New Goals to be achieved in __4__  weeks:  1. Achieve goal #2  2. Pt will be able to perform volleyball drills including cutting asymptompatically in order to return to sport  3.    RECOMMENDATIONS  Cont therapy 1-2x/4 weeks   If you have any questions/comments please contact us directly at [phone]. Thank you for allowing us to assist in the care of your patient. Therapist Signature: Javier Franklin DPT, [facility]  Date: 5/23/2018     Time: 9:33 AM   NOTE TO PHYSICIAN:  PLEASE COMPLETE THE ORDERS BELOW AND FAX TO   [facility] Physical [facility]: [phone]  If you are unable to process this request in 24 hours please contact our office: [phone]    ___ I have read the above report and request that my patient continue as recommended.   ___ I have read the above report and request that my patient continue therapy with the following changes/special instructions:_________________________________________________________   ___ I have read the above report and request that my patient be discharged from therapy.      Physician Signature:        Date:       Time:

## 2018-05-24 ENCOUNTER — HOSPITAL ENCOUNTER (OUTPATIENT)
Dept: PHYSICAL THERAPY | Age: 28
Discharge: HOME | End: 2018-05-24
Payer: OTHER GOVERNMENT

## 2018-05-24 PROCEDURE — 97110 THERAPEUTIC EXERCISES: CPT | Performed by: PHYSICAL THERAPIST

## 2018-05-24 PROCEDURE — 97530 THERAPEUTIC ACTIVITIES: CPT | Performed by: PHYSICAL THERAPIST

## 2018-05-24 PROCEDURE — 97140 MANUAL THERAPY 1/> REGIONS: CPT | Performed by: PHYSICAL THERAPIST

## 2018-05-24 NOTE — PROGRESS NOTES
Shayan Kerr   PHYSICAL THERAPY - DAILY TREATMENT NOTE    Patient Name: Caron Tran        Date: 2018  : 1990   yes Patient  Verified  Visit #:   32   of   39  Insurance: Payor:  / Plan: Marquis Stein 74 / Product Type:  /      In time: 540 Out time: 640   Total Treatment Time: 50     Medicare Time Tracking (below)   Total Timed Codes (min):  na 1:1 Treatment Time:  na     TREATMENT AREA =  Left knee pain [M25.562]    SUBJECTIVE  Pain Level (on 0 to 10 scale):  0  / 10   Medication Changes/New allergies or changes in medical history, any new surgeries or procedures?    no  If yes, update Summary List   Subjective Functional Status/Changes:  []  No changes reported     Just a little sore in the front part of my knee and I could feel it more when I walk up/down the stairs        OBJECTIVE  Modalities Rationale:     decrease inflammation, decrease pain and increase tissue extensibility to improve patient's ability to complete adls   min [] Estim, type/location:                                      []  att     []  unatt     []  w/US     []  w/ice    []  w/heat    min []  Mechanical Traction: type/lbs                   []  pro   []  sup   []  int   []  cont    []  before manual    []  after manual    min []  Ultrasound, settings/location:      min []  Iontophoresis w/ dexamethasone, location:                                               []  take home patch       []  in clinic   10 min [x]  Ice     []  Heat    location/position: Long sit     min []  Vasopneumatic Device, press/temp:     min []  Other:    [x] Skin assessment post-treatment (if applicable):    []  intact    [x]  redness- no adverse reaction     []redness - adverse reaction:        10 min Therapeutic Exercise:  [x]  See flow sheet   Rationale:      increase strength, improve coordination, improve balance and increase proprioception to improve the patients ability to complete adls     20 min Manual Therapy: Tibial distraction and er, quad/hs stretch and release   Rationale:      decrease pain, increase ROM, increase tissue extensibility and decrease edema  to improve patient's ability to run    20 min Therapeutic Activity: Linear pass, set and line approach,    Rationale:    increase ROM, increase strength, improve coordination, improve balance and increase proprioception to improve the patients ability to return to sport          min Patient Education:  yes  Reviewed HEP   []  Progressed/Changed HEP based on: Other Objective/Functional Measures:  Broad jump: 70\"  Vertical\" 28\"   Still has difficulty with single leg push off/power   Held on lateral exercises due to anterior knee discomfort (no swelling, change in rom or joint mobility)     Post Treatment Pain Level (on 0 to 10) scale:   0  / 10     ASSESSMENT  Assessment/Changes in Function:     Continuing to initiate return to sport activities in clinic she did have patella femoral discomfort after last session but \"more like a workout\"     []  See Progress Note/Recertification   Patient will continue to benefit from skilled PT services to modify and progress therapeutic interventions, address functional mobility deficits, address strength deficits, analyze and address soft tissue restrictions, analyze and cue movement patterns, analyze and modify body mechanics/ergonomics, assess and modify postural abnormalities and instruct in home and community integration to attain remaining goals.    Progress toward goals / Updated goals:    Progressing with sports specific program      PLAN  []  Upgrade activities as tolerated yes Continue plan of care   []  Discharge due to :    []  Other:      Therapist: Ashley Schulz PT    Date: 5/24/2018 Time: 4:50 PM     Future Appointments  Date Time Provider Candido Mccarty   5/24/2018 5:30 PM Ashley Schulz PT [facility]   6/5/2018 5:30 PM [address] [place] [address] [address]   6/7/2018 5:30 PM [address] [place] [address] [address]   6/11/2018 6:00 PM Hilary Mcconnell Mercedes Arreola, PT [facility]   6/13/2018 6:00 PM [address], PT [facility]

## 2018-06-05 ENCOUNTER — HOSPITAL ENCOUNTER (OUTPATIENT)
Dept: PHYSICAL THERAPY | Age: 28
Discharge: HOME | End: 2018-06-05
Payer: OTHER GOVERNMENT

## 2018-06-05 PROCEDURE — 97110 THERAPEUTIC EXERCISES: CPT | Performed by: PHYSICAL THERAPIST

## 2018-06-05 PROCEDURE — 97530 THERAPEUTIC ACTIVITIES: CPT | Performed by: PHYSICAL THERAPIST

## 2018-06-05 PROCEDURE — 97140 MANUAL THERAPY 1/> REGIONS: CPT | Performed by: PHYSICAL THERAPIST

## 2018-06-05 NOTE — PROGRESS NOTES
Kacey Laird PHYSICAL THERAPY - DAILY TREATMENT NOTE    Patient Name: Randolph Morales        Date: 2018  : 1990   yes Patient  Verified  Visit #:   28 of   39  Insurance: Payor:  / Plan: Marquis Stein 74 / Product Type:  /      In time: 530 Out time: 640   Total Treatment Time: 60     Medicare Time Tracking (below)   Total Timed Codes (min):  na 1:1 Treatment Time:  na     TREATMENT AREA =  Left knee pain [M25.562]    SUBJECTIVE  Pain Level (on 0 to 10 scale):  0  / 10   Medication Changes/New allergies or changes in medical history, any new surgeries or procedures?    no  If yes, update Summary List   Subjective Functional Status/Changes:  []  No changes reported     I am still recovering from this work out that I did last week - rdl, ghd, sit ups, squats 30-25-15-10 each followed by .5 mile run. My hamstrings have been really tight since then.       OBJECTIVE  Modalities Rationale:     decrease inflammation, decrease pain and increase tissue extensibility to improve patient's ability to complete adls   min [] Estim, type/location:                                      []  att     []  unatt     []  w/US     []  w/ice    []  w/heat    min []  Mechanical Traction: type/lbs                   []  pro   []  sup   []  int   []  cont    []  before manual    []  after manual    min []  Ultrasound, settings/location:      min []  Iontophoresis w/ dexamethasone, location:                                               []  take home patch       []  in clinic   10 min [x]  Ice     []  Heat    location/position: Long sit     min []  Vasopneumatic Device, press/temp:     min []  Other:    [x] Skin assessment post-treatment (if applicable):    []  intact    [x]  redness- no adverse reaction     []redness - adverse reaction:        10 min Therapeutic Exercise:  [x]  See flow sheet   Rationale:      increase strength, improve coordination, improve balance and increase proprioception to improve the patients ability to complete adls     20 min Manual Therapy: Sacral RR, l5 l rotation, hs release and stretch, tibial distraction   Rationale:      decrease pain, increase ROM, increase tissue extensibility and decrease edema  to improve patient's ability to run    20 min Therapeutic Activity: 4 corner x4, forward to lateral jump 2 to 2, 2 to 1, 1 to 8-10 reps each, lateral shuffle   Rationale:    increase ROM, increase strength, improve coordination, improve balance and increase proprioception to improve the patients ability to return to sport          min Patient Education:  yes  Reviewed HEP   []  Progressed/Changed HEP based on: Other Objective/Functional Measures:  Initiated session with dynamic warm up followed by 5' run on tm - runk in noted L rotation with inability to perform mid stance strike on L - lumbar screen demo L5/s1 dysfunction that improved with mt  Able to perform 1 to 1 with good eccentric load but reduced power on last 3 reps     Post Treatment Pain Level (on 0 to 10) scale:   0  / 10     ASSESSMENT  Assessment/Changes in Function:   Pt has been compliant with push/pull strengthening outside of clinic and sports specific drills in clinic      []  See Progress Note/Recertification   Patient will continue to benefit from skilled PT services to modify and progress therapeutic interventions, address functional mobility deficits, address strength deficits, analyze and address soft tissue restrictions, analyze and cue movement patterns, analyze and modify body mechanics/ergonomics, assess and modify postural abnormalities and instruct in home and community integration to attain remaining goals.    Progress toward goals / Updated goals:    Continuing with current program to address ltg #2 with progression towards d/c - do not feel pt is able to perform pivoting or cutting without supervision on correction      PLAN  []  Upgrade activities as tolerated yes Continue plan of care   []  Discharge due to :    []  Other:      Therapist: Amnada Jhaveri, PT    Date: 6/5/2018 Time: 4:50 PM     Future Appointments  Date Time Provider Candido Mccarty   6/5/2018 5:30 PM Amanda Jhaveri, PT [facility]   6/7/2018 5:30 PM [address] [place] [address], [address] [place] [address]   6/11/2018 6:00 PM [address] [place] [address], PT [facility]   6/13/2018 6:00 PM [address] [place] [address], PT [facility]   6/18/2018 5:30 PM [address] [place] [address], PT [facility]   6/20/2018 1:00 PM [address] [place] [address], [address] [place] [address]   6/25/2018 5:30 PM [address] [place] [address], PT [facility]   6/27/2018 5:30 PM [address] [place] [address], PT [facility]

## 2018-06-07 ENCOUNTER — HOSPITAL ENCOUNTER (OUTPATIENT)
Dept: PHYSICAL THERAPY | Age: 28
Discharge: HOME | End: 2018-06-07
Payer: OTHER GOVERNMENT

## 2018-06-07 PROCEDURE — 97110 THERAPEUTIC EXERCISES: CPT | Performed by: PHYSICAL THERAPIST

## 2018-06-07 PROCEDURE — 97530 THERAPEUTIC ACTIVITIES: CPT | Performed by: PHYSICAL THERAPIST

## 2018-06-07 PROCEDURE — 97140 MANUAL THERAPY 1/> REGIONS: CPT | Performed by: PHYSICAL THERAPIST

## 2018-06-07 NOTE — PROGRESS NOTES
Jose Stevens   PHYSICAL THERAPY - DAILY TREATMENT NOTE    Patient Name: Aissatou Jerome        Date: 2018  : 1990   yes Patient  Verified  Visit #:   35 of   39  Insurance: Payor:  / Plan: Funmi Hurtado / Product Type:  /      In time: 525 Out time: 640   Total Treatment Time: 70     Medicare Time Tracking (below)   Total Timed Codes (min):  na 1:1 Treatment Time:  na     TREATMENT AREA =  Left knee pain [M25.562]    SUBJECTIVE  Pain Level (on 0 to 10 scale):  0  / 10   Medication Changes/New allergies or changes in medical history, any new surgeries or procedures?    no  If yes, update Summary List   Subjective Functional Status/Changes:  []  No changes reported   I just did an upper body work out only yesterday because my hamstrings are still recovering from my cross fit work out from last week      OBJECTIVE  Modalities Rationale:     decrease inflammation, decrease pain and increase tissue extensibility to improve patient's ability to complete adls   min [] Estim, type/location:                                      []  att     []  unatt     []  w/US     []  w/ice    []  w/heat    min []  Mechanical Traction: type/lbs                   []  pro   []  sup   []  int   []  cont    []  before manual    []  after manual    min []  Ultrasound, settings/location:      min []  Iontophoresis w/ dexamethasone, location:                                               []  take home patch       []  in clinic   10 min [x]  Ice     []  Heat    location/position: Long sit     min []  Vasopneumatic Device, press/temp:     min []  Other:    [x] Skin assessment post-treatment (if applicable):    []  intact    [x]  redness- no adverse reaction     []redness - adverse reaction:        15 min Therapeutic Exercise:  [x]  See flow sheet   Rationale:      increase strength, improve coordination, improve balance and increase proprioception to improve the patients ability to complete adls     15 min Manual Therapy: Sacral RR, l5 l rotation, hs release and stretch, tibial distraction   Rationale:      decrease pain, increase ROM, increase tissue extensibility and decrease edema  to improve patient's ability to run    30 min Therapeutic Activity: See below    Rationale:    increase ROM, increase strength, improve coordination, improve balance and increase proprioception to improve the patients ability to return to sport          min Patient Education:  yes  Reviewed HEP   []  Progressed/Changed HEP based on: Other Objective/Functional Measures:  Hurdles: forward to lateral over 12\" 10x each (decreased eccentric load on R turns), ladder drills (in/in out/out, karaoke, lateral shuffle x4 each)  Pivoting drills: shuffle pivot turn 8x each  Rotational drills: attempted 10# but pt with poor form and inability to plant - reduced to 8# and able to complete  Difficulty performing line approach with jumping off dominate leg for outside hitting (vertical approximately 3-4 inches) but denied pain      Post Treatment Pain Level (on 0 to 10) scale:   0  / 10     ASSESSMENT  Assessment/Changes in Function:   Demonstrating pivoting apprehension and decreased rotational stability limited sports related participation      []  See Progress Note/Recertification   Patient will continue to benefit from skilled PT services to modify and progress therapeutic interventions, address functional mobility deficits, address strength deficits, analyze and address soft tissue restrictions, analyze and cue movement patterns, analyze and modify body mechanics/ergonomics, assess and modify postural abnormalities and instruct in home and community integration to attain remaining goals.    Progress toward goals / Updated goals:    FOTO 69/100   Still requires cues for sports specific drills - progress towards ltg      PLAN  []  Upgrade activities as tolerated yes Continue plan of care   []  Discharge due to :    []  Other:      Therapist: Patricia Wilcox Carlo, PT    Date: 6/7/2018 Time: 4:50 PM     Future Appointments  Date Time Provider Candido Mccarty   6/11/2018 6:00 PM [address] [facility]   6/13/2018 6:00 PM [address] [facility]   6/18/2018 5:30 PM [address] [facility]   6/20/2018 1:00 PM [address] [place] [address],  [address]   6/25/2018 5:30 PM José Miguel Varela, PT [facility]   6/27/2018 5:30 PM José Miguel Varela PT [facility]

## 2018-06-11 ENCOUNTER — HOSPITAL ENCOUNTER (OUTPATIENT)
Dept: PHYSICAL THERAPY | Age: 28
Discharge: HOME | End: 2018-06-11
Payer: OTHER GOVERNMENT

## 2018-06-11 PROCEDURE — 97110 THERAPEUTIC EXERCISES: CPT

## 2018-06-11 PROCEDURE — 97530 THERAPEUTIC ACTIVITIES: CPT

## 2018-06-11 NOTE — PROGRESS NOTES
PHYSICAL THERAPY - DAILY TREATMENT NOTE    Patient Name: Paulina John        Date: 2018  : 1990   yes Patient  Verified  Visit #:   29   of   39  Insurance: Payor:  / Plan: Marquis Stein 74 / Product Type:  /      In time: 5:55 Out time: 6:59   Total Treatment Time: 64     Medicare/BCBS Time Tracking (below)   Total Timed Codes (min):  na 1:1 Treatment Time:  na     TREATMENT AREA =  Left knee pain [M25.562]    SUBJECTIVE  Pain Level (on 0 to 10 scale):  0  / 10   Medication Changes/New allergies or changes in medical history, any new surgeries or procedures?    no  If yes, update Summary List   Subjective Functional Status/Changes:  []  No changes reported     Pt reports she ran 1.25 miles on TM this weekend w/o pain and felt she was running w/ symmetrical stride.            OBJECTIVE  Modalities Rationale:     decrease inflammation to improve patient's ability to return to sport   min [] Estim, type/location:                                      []  att     []  unatt     []  w/US     []  w/ice    []  w/heat    min []  Mechanical Traction: type/lbs                   []  pro   []  sup   []  int   []  cont    []  before manual    []  after manual    min []  Ultrasound, settings/location:      min []  Iontophoresis w/ dexamethasone, location:                                               []  take home patch       []  in clinic   10 min [x]  Ice     []  Heat    location/position: To L knee in long sit    min []  Vasopneumatic Device, press/temp:     min []  Other:    [x] Skin assessment post-treatment (if applicable):    [x]  intact    []  redness- no adverse reaction     []redness - adverse reaction:        10 min Therapeutic Exercise:  [x]  See flow sheet   Rationale:      increase strength and improve coordination to improve the patients ability to return to sport     44 min Therapeutic Activity: Volleyball passing drills, approach to pivot, lateral shuffle to block, block to pivot; lateral shuffle plant to lateral shuffle; lateral shuffle to plant/pivot turn B; DL to DL triple 12\" yesica hop, DL to DL forward to lateral single 12\" yesica hop; DL to SL forward to lateral floor hop   Rationale:    increase strength, improve coordination, improve balance and increase proprioception to improve the patients ability to return to sport     min Patient Education:  yes  Reviewed HEP   []  Progressed/Changed HEP based on: Other Objective/Functional Measures:    Pt cont to demo dec ability to plant and pivot over L LE w/ dec power noted on single limb activity  Poor glute loading and power development L rotational medball pass     Post Treatment Pain Level (on 0 to 10) scale:   0  / 10     ASSESSMENT  Assessment/Changes in Function:     Pt req frequent encouragement in status of LE and ability to perform single leg rotational and lateral movements. Demo dec proprioceptive awareness in SL activity. []  See Progress Note/Recertification   Patient will continue to benefit from skilled PT services to modify and progress therapeutic interventions, address functional mobility deficits, address strength deficits, analyze and address soft tissue restrictions, analyze and cue movement patterns, analyze and modify body mechanics/ergonomics, assess and modify postural abnormalities and instruct in home and community integration to attain remaining goals.    Progress toward goals / Updated goals:    Slow progress to LTG #2     PLAN  []  Upgrade activities as tolerated yes Continue plan of care   []  Discharge due to :    []  Other:      Therapist: Lauryn Larry PT    Date: 6/11/2018 Time: 7:05 PM     Future Appointments  Date Time Provider Candido Mccarty   6/13/2018 6:00 PM Lauryn Larry PT [facility]   6/18/2018 5:30 PM Lauryn Larry, PT [facility]   6/20/2018 1:00 PM [address] [place] [address],  [address]   6/25/2018 5:30 PM Lauryn Larry, PT [facility]   6/27/2018 5:30 PM Kady CASILLAS Ambrose GARCIA [facility]

## 2018-06-13 ENCOUNTER — HOSPITAL ENCOUNTER (OUTPATIENT)
Dept: PHYSICAL THERAPY | Age: 28
Discharge: HOME | End: 2018-06-13
Payer: OTHER GOVERNMENT

## 2018-06-13 PROCEDURE — 97110 THERAPEUTIC EXERCISES: CPT

## 2018-06-13 PROCEDURE — 97530 THERAPEUTIC ACTIVITIES: CPT

## 2018-06-13 NOTE — PROGRESS NOTES
PHYSICAL THERAPY - DAILY TREATMENT NOTE    Patient Name: Kenya Rasmussen        Date: 2018  : 1990   yes Patient  Verified  Visit #:   28   of   39  Insurance: Payor:  / Plan: Angela Tran / Product Type:  /      In time: 5:53 Out time: 6:58   Total Treatment Time: 65     Medicare/BCBS Time Tracking (below)   Total Timed Codes (min):  na 1:1 Treatment Time:  na     TREATMENT AREA =  Left knee pain [M25.562]    SUBJECTIVE  Pain Level (on 0 to 10 scale):  0  / 10   Medication Changes/New allergies or changes in medical history, any new surgeries or procedures?    no  If yes, update Summary List   Subjective Functional Status/Changes:  []  No changes reported     Pt reports no pain or discomfort after last visit. Questioned when it would be appropriate to perform conditioning on the beach          OBJECTIVE  Modalities Rationale:     decrease inflammation and decrease pain to improve patient's ability to ambulate   min [] Estim, type/location:                                      []  att     []  unatt     []  w/US     []  w/ice    []  w/heat    min []  Mechanical Traction: type/lbs                   []  pro   []  sup   []  int   []  cont    []  before manual    []  after manual    min []  Ultrasound, settings/location:      min []  Iontophoresis w/ dexamethasone, location:                                               []  take home patch       []  in clinic   10 min [x]  Ice     []  Heat    location/position:  To the  L knee in long sit    min []  Vasopneumatic Device, press/temp:     min []  Other:    [x] Skin assessment post-treatment (if applicable):    [x]  intact    []  redness- no adverse reaction     []redness - adverse reaction:        15 min Therapeutic Exercise:  [x]  See flow sheet   Rationale:      increase strength and improve coordination to improve the patients ability to return to sport     40 min Therapeutic Activity: Ladder drills, plyometrics, lateral drills, pivoting drills, deceleration drills, triple extension wall drills   Rationale:    increase strength, improve coordination and increase proprioception to improve the patients ability to run/jump     min Patient Education:  yes  Reviewed HEP   []  Progressed/Changed HEP based on: Other Objective/Functional Measures:    Ladder drills: 2 in forward/lateral, 2 in forward and lateral, icky shuffle  Plyo: DL to SL 6\" 2x5 up forward and lateral, SL 6\" drop step to 12\" lateral yesica hop 5x B  Lateral drills: SL lateral bounding single line hop; lateral bounding double line hop  Pivoting drills: lateral shuffle to cross over sprint  Deceleration drills: double leg and single leg decel to cones, straight plane decel to lateral shuffle    Pt demo difficulty achieving and maintaining triple extension on the L during wall drills w/ dec hip drive and inc time spent on ground L during change     Post Treatment Pain Level (on 0 to 10) scale:   0  / 10     ASSESSMENT  Assessment/Changes in Function:     Advised pt to refrain from running, cutting, or pivoting on sand at this time; once demo ability to perform these activities on level surface/turf w/o hesitation and adequate power production will discuss transitioning to unlevel surfaces at that time     []  See Progress Note/Recertification   Patient will continue to benefit from skilled PT services to modify and progress therapeutic interventions, address functional mobility deficits, address strength deficits, analyze and address soft tissue restrictions, analyze and cue movement patterns, analyze and modify body mechanics/ergonomics and instruct in home and community integration to attain remaining goals.    Progress toward goals / Updated goals:    Continues w/ dec confidence in the L LE, limiting power production through the [address]  []  Upgrade activities as tolerated yes Continue plan of care   []  Discharge due to :    []  Other:      Therapist: Chantell Vickers Whitney Wendy    Date: 6/13/2018 Time: 6:07 PM     Future Appointments  Date Time Provider Candido Mccarty   6/18/2018 5:30 PM Massiel River, PT [facility]   6/20/2018 1:00 PM [address] [place] [address], [address]   6/25/2018 5:30 PM Massiel River, PT [facility]   6/27/2018 5:30 PM Masisel River, PT [facility]

## 2018-06-18 ENCOUNTER — HOSPITAL ENCOUNTER (OUTPATIENT)
Dept: PHYSICAL THERAPY | Age: 28
Discharge: HOME | End: 2018-06-18
Payer: OTHER GOVERNMENT

## 2018-06-18 PROCEDURE — 97530 THERAPEUTIC ACTIVITIES: CPT

## 2018-06-18 PROCEDURE — 97110 THERAPEUTIC EXERCISES: CPT

## 2018-06-18 NOTE — PROGRESS NOTES
PHYSICAL THERAPY - DAILY TREATMENT NOTE    Patient Name: Juan A Cherry        Date: 2018  : 1990   yes Patient  Verified  Visit #:   39   of   39  Insurance: Payor: KIMO / Plan: Marquis Stein 74 / Product Type:  /      In time: 5:25 Out time: 6:20   Total Treatment Time: 55     Medicare/BCBS Time Tracking (below)   Total Timed Codes (min):  na 1:1 Treatment Time:  na     TREATMENT AREA =  Left knee pain [M25.562]    SUBJECTIVE  Pain Level (on 0 to 10 scale): 0  / 10   Medication Changes/New allergies or changes in medical history, any new surgeries or procedures?    no  If yes, update Summary List   Subjective Functional Status/Changes:  []  No changes reported     Pt reports no pain or discomfort after last session.            OBJECTIVE  Modalities Rationale:     decrease inflammation to improve patient's ability to ambulate   min [] Estim, type/location:                                      []  att     []  unatt     []  w/US     []  w/ice    []  w/heat    min []  Mechanical Traction: type/lbs                   []  pro   []  sup   []  int   []  cont    []  before manual    []  after manual    min []  Ultrasound, settings/location:      min []  Iontophoresis w/ dexamethasone, location:                                               []  take home patch       []  in clinic   10 min [x]  Ice     []  Heat    location/position: To L knee inl kiko sit    min []  Vasopneumatic Device, press/temp:     min []  Other:    [] Skin assessment post-treatment (if applicable):    []  intact    []  redness- no adverse reaction     []redness - adverse reaction:        15 min Therapeutic Exercise:  [x]  See flow sheet   Rationale:      increase strength and improve coordination to improve the patients ability to return to sport     30 min Therapeutic Activity: Plyometrics, reactive drills, volleyball drills, pivoting/directional change drill   Rationale:    increase strength, improve coordination, improve balance and increase proprioception to improve the patients ability to return to sport     min Patient Education:  yes  Reviewed HEP   []  Progressed/Changed HEP based on: Other Objective/Functional Measures:    Plyo: DL to DL 18\" up; DL to SL 12\" up forward and lateral, SL to SL up/down lateral 6\", SL to SL lateral up/down/up 6\", SL to SL lateral down f/b lateral bound to R  Reactive drill: 4 corner drill, lateral shuffle/sprint  Volleyball drills: short/long balls w/ focus on directional change (lat shuffle and pivot)  Pivoting/direction changes: forward sprint/decel/back pedal; sprint/decel/lateral shuffle, sprint/decel/pivot turn sprint     Post Treatment Pain Level (on 0 to 10) scale:   0  / 10     ASSESSMENT  Assessment/Changes in Function:     Pt cont to demo decrease explosive power in SL exercise L w/ hesitation to pivot over L LE. Fatigue reported L quad following SL plyometric activity     []  See Progress Note/Recertification   Patient will continue to benefit from skilled PT services to modify and progress therapeutic interventions, address functional mobility deficits, address strength deficits, analyze and address soft tissue restrictions, analyze and cue movement patterns, analyze and modify body mechanics/ergonomics and instruct in home and community integration to attain remaining goals.    Progress toward goals / Updated goals:    Pt demonstrating dec confidence/proprioceptive awareness to L LE contributing to slow return to sport       PLAN  []  Upgrade activities as tolerated yes Continue plan of care   []  Discharge due to :    []  Other:      Therapist: Marco Pedro PT    Date: 6/18/2018 Time: 5:19 PM     Future Appointments  Date Time Provider Candido Mccarty   6/18/2018 5:30 PM Marco Pedro, JACQUELINE [facility]   6/20/2018 1:00 PM [address] [place] [address],  [address]   6/25/2018 5:30 PM Marco Pedro PT [facility]   6/27/2018 5:30 PM Marco Pedro PT [facility]

## 2018-06-20 ENCOUNTER — HOSPITAL ENCOUNTER (OUTPATIENT)
Dept: PHYSICAL THERAPY | Age: 28
Discharge: HOME | End: 2018-06-20
Payer: OTHER GOVERNMENT

## 2018-06-20 PROCEDURE — 97110 THERAPEUTIC EXERCISES: CPT | Performed by: PHYSICAL THERAPIST

## 2018-06-20 PROCEDURE — 97140 MANUAL THERAPY 1/> REGIONS: CPT | Performed by: PHYSICAL THERAPIST

## 2018-06-20 PROCEDURE — 97530 THERAPEUTIC ACTIVITIES: CPT | Performed by: PHYSICAL THERAPIST

## 2018-06-20 NOTE — PROGRESS NOTES
[facility] PHYSICAL THERAPY - DAILY TREATMENT NOTE    Patient Name: Christian Treivno        Date: 2018  : 1990   yes Patient  Verified  Visit #:   40   of   40  Insurance: Payor:  / Plan: Lizbeth Ellsworth / Product Type:  /      In time: 100 Out time: 212   Total Treatment Time: 70     Medicare/[facility] Time Tracking (below)   Total Timed Codes (min):  na 1:1 Treatment Time:  na     TREATMENT AREA =  Left knee pain [M25.562]    SUBJECTIVE  Pain Level (on 0 to 10 scale):  0  / 10   Medication Changes/New allergies or changes in medical history, any new surgeries or procedures?    no  If yes, update Summary List   Subjective Functional Status/Changes:  []  No changes reported   See pn          OBJECTIVE      20 min Therapeutic Exercise:  [x]  See flow sheet   Rationale:      increase strength, improve coordination, improve balance and increase proprioception to improve the patients ability to return to sport     15 min Manual Therapy: Dtm/stretch hs and quad, gm   Rationale:      decrease pain, increase ROM, increase tissue extensibility and decrease trigger points to improve patient's ability to return to sport     35 min Therapeutic Activity: Drop jump to high knees off 12\"/36\" super set with lateral jumps over 6\" yesica 4x8, power step ups 12\" 2x10, volleyball line approach, triple hop digging see flow sheet    Rationale:    increase ROM, increase strength, improve coordination, improve balance and increase proprioception to improve the patients ability to return to sport        min Patient Education:  yes  Reviewed HEP   []  Progressed/Changed HEP based on:        Other Objective/Functional Measures:    See pn     Post Treatment Pain Level (on 0 to 10) scale:   0  / 10     ASSESSMENT  Assessment/Changes in Function:     See pn     []  See Progress Note/Recertification   Patient will continue to benefit from skilled PT services to modify and progress therapeutic interventions, address strength deficits, analyze and cue movement patterns, analyze and modify body mechanics/ergonomics and instruct in home and community integration to attain remaining goals.    Progress toward goals / Updated goals:    See pn     PLAN  []  Upgrade activities as tolerated yes Continue plan of care   []  Discharge due to :    []  Other:      Therapist: Delphine Murillo, PT    Date: 6/20/2018 Time: 4:23 PM     Future Appointments  Date Time Provider Candido Mccarty   6/25/2018 5:30 PM [address] [place] [address], PT [facility]   6/27/2018 5:30 PM [address] [place] [address], PT [facility]

## 2018-06-20 NOTE — PROGRESS NOTES
.[facility] [address] THERAPY  [address] [place] [address], [facility], [facility] - Phone: [phone]  Fax: [phone]  PROGRESS NOTE  Patient Name: Cornelio Dennis : 1990   Treatment/Medical Diagnosis: Left knee pain [M25.562]   Referral Source: Destiny Verma,*     Date of Initial Visit: 17 Attended Visits: 37 Missed Visits: See below     SUMMARY OF TREATMENT  Pt has attended 37 sessions of PT s/p L ACLR. PT tx has consisted of therex, manual therapy (prom/mobs/stm) modalities, and HEP in order to improve L knee ROM, LE strength/stability, balance/propriception, dec pain, and improve functional mobility. CURRENT STATUS  She has consistently been attending 2x/3 weeks with emphasis on sports related drills. She still has difficulty decelerating and single leg drive and push off. Denies pain but is just limited with her power and noted apprehension. As of recent she has demonstrated difficulty progressing these exercises I. Would like 1x/3weeks to ensure this with anticipation of d.c at that time     Goal/Measure of Progress Goal Met? 1. Pt will be able to perform volleyball drills including cutting asymptompatically in order to return to sport   Status at last Eval: na Current Status: See above progressing   2. Pt will be I with high level hep in order ot prepare for    Status at last Eval: Still requires cues Current Status: Still requires cues progressing   3. Status at last Eval:  Current Status:       New Goals to be achieved in __3__  weeks:  1. Achieve goals above  2.  3.   RECOMMENDATIONS  Cont therapy 1x/3weeks   If you have any questions/comments please contact us directly at [phone]. Thank you for allowing us to assist in the care of your patient.     Therapist Signature: Dejuan Akers DPT, [facility]  Date: 2018     Time: 9:33 AM   NOTE TO PHYSICIAN:  PLEASE COMPLETE THE ORDERS BELOW AND FAX TO   [facility]: [phone]  If you are unable to process this request in 24 hours please contact our office: [phone]    ___ I have read the above report and request that my patient continue as recommended.   ___ I have read the above report and request that my patient continue therapy with the following changes/special instructions:_________________________________________________________   ___ I have read the above report and request that my patient be discharged from therapy.      Physician Signature:        Date:       Time:

## 2018-06-25 ENCOUNTER — HOSPITAL ENCOUNTER (OUTPATIENT)
Dept: PHYSICAL THERAPY | Age: 28
Discharge: HOME | End: 2018-06-25
Payer: OTHER GOVERNMENT

## 2018-06-25 PROCEDURE — 97530 THERAPEUTIC ACTIVITIES: CPT

## 2018-06-25 PROCEDURE — 97110 THERAPEUTIC EXERCISES: CPT

## 2018-06-25 NOTE — PROGRESS NOTES
PHYSICAL THERAPY - DAILY TREATMENT NOTE    Patient Name: Morena Carrillo        Date: 2018  : 1990   yes Patient  Verified  Visit #:   45   of   40  Insurance: Payor: KIMO / Plan: Marquis Stein 74 / Product Type:  /      In time: 5:34 Out time: 6:27   Total Treatment Time: 53     Medicare/BCBS Time Tracking (below)   Total Timed Codes (min):  na 1:1 Treatment Time:  na     TREATMENT AREA =  Left knee pain [M25.562]    SUBJECTIVE  Pain Level (on 0 to 10 scale):  0  / 10   Medication Changes/New allergies or changes in medical history, any new surgeries or procedures?    no  If yes, update Summary List   Subjective Functional Status/Changes:  []  No changes reported     Pt reports she did SL box jumps on 14\" box as well as cleans, deads, and front squats this weekend w/o issue. OBJECTIVE  Modalities Rationale:     decrease inflammation and decrease pain to improve patient's ability to complete jumping   min [] Estim, type/location:                                      []  att     []  unatt     []  w/US     []  w/ice    []  w/heat    min []  Mechanical Traction: type/lbs                   []  pro   []  sup   []  int   []  cont    []  before manual    []  after manual    min []  Ultrasound, settings/location:      min []  Iontophoresis w/ dexamethasone, location:                                               []  take home patch       []  in clinic   10 min [x]  Ice     []  Heat    location/position:  To the L knee in long sit    min []  Vasopneumatic Device, press/temp:     min []  Other:    [] Skin assessment post-treatment (if applicable):    []  intact    []  redness- no adverse reaction     []redness - adverse reaction:        10 min Therapeutic Exercise:  [x]  See flow sheet   Rationale:      increase strength, improve coordination and increase proprioception to improve the patients ability to return to sport     33 min Therapeutic Activity: Plyo, reactive/directional change drills, postural holds   Rationale:    increase strength, improve coordination and increase proprioception to improve the patients ability to return to sport       min Patient Education:  yes  Reviewed HEP   []  Progressed/Changed HEP based on: Other Objective/Functional Measures:    Plyo: DL to SL 12\" up, SL to SL 12\" up, 24\" depth jump to DL 36\", 24\" depth jump to lateral 12\" SL; Heidon hops L/R/L 2x5  Directional change: lat shuffle x 2 to sprint B directions, forward sprint to diagonal shuffle x 5 each direction, cross over to base reactive drill L/R  Postural holds: load/lift L, load/lift/switch L/R, load/lift/switch L/R/L     Post Treatment Pain Level (on 0 to 10) scale:    0  / 10     ASSESSMENT  Assessment/Changes in Function:     Pt demo improved confidence in the L knee this visit w/ improved pivot, push-off, deceleration, and power behind L LE. Cont w/ some difficulty maintain triple extension L after switch wall drills     []  See Progress Note/Recertification   Patient will continue to benefit from skilled PT services to modify and progress therapeutic interventions, address functional mobility deficits, address ROM deficits, address strength deficits, analyze and address soft tissue restrictions, analyze and cue movement patterns, analyze and modify body mechanics/ergonomics and instruct in home and community integration to attain remaining goals.    Progress toward goals / Updated goals:    Good progress to LTG #1 this visit     PLAN  []  Upgrade activities as tolerated yes Continue plan of care   []  Discharge due to :    []  Other:      Therapist: Kathleen Fontana PT    Date: 6/25/2018 Time: 4:25 PM     Future Appointments  Date Time Provider Candido Mccarty   6/25/2018 5:30 PM Kathleen Fontana, PT [facility]   6/27/2018 5:30 PM Kathleen Fontana, PT [facility]

## 2018-06-27 ENCOUNTER — APPOINTMENT (OUTPATIENT)
Dept: PHYSICAL THERAPY | Age: 28
End: 2018-06-27
Payer: OTHER GOVERNMENT

## 2018-07-10 ENCOUNTER — HOSPITAL ENCOUNTER (OUTPATIENT)
Dept: PHYSICAL THERAPY | Age: 28
Discharge: HOME | End: 2018-07-10
Payer: OTHER GOVERNMENT

## 2018-07-10 PROCEDURE — 97530 THERAPEUTIC ACTIVITIES: CPT | Performed by: PHYSICAL THERAPIST

## 2018-07-10 NOTE — PROGRESS NOTES
[facility] PHYSICAL THERAPY - DAILY TREATMENT NOTE    Patient Name: Sandrita Dawkins        Date: 7/10/2018  : 1990   yes Patient  Verified  Visit #:   44 of   40  Insurance: Payor:  / Plan: [place] / Product Type:  /      In time: 530 Out time: 625   Total Treatment Time: 55     Medicare/BCBS Time Tracking (below)   Total Timed Codes (min):  na 1:1 Treatment Time:  na     TREATMENT AREA =  Left knee pain [M25.562]    SUBJECTIVE  Pain Level (on 0 to 10 scale):  0  / 10   Medication Changes/New allergies or changes in medical history, any new surgeries or procedures?    no  If yes, update Summary List   Subjective Functional Status/Changes:  []  No changes reported     I am just really ready to start playing - I feel like my knee is 90-95% but I am not going to push anything until you guys say otherwise        OBJECTIVE         45 min Therapeutic Activity: Drop jump to high knees/tuck off landing double to single 10x each /36\"  super set with lateral jumps over 6\" yesica 4x8, power step ups 12\" 2x10, volleyball line approach with left/right push off 8x each, triple hop digging    Rationale:    increase ROM, increase strength, improve coordination, improve balance and increase proprioception to improve the patients ability to return to sport        min Patient Education:  yes  Reviewed HEP   []  Progressed/Changed HEP based on: Other Objective/Functional Measures:  Pt demonstrated apprehension with outside line approach and decreased ability to pivot/push off on L, normal from middle with good weight acceptance during eccentric phase   Demonstrated similar pattern with single leg drop jumps off 36\" box     Post Treatment Pain Level (on 0 to 10) scale:   0  / 10     ASSESSMENT  Assessment/Changes in Function:     contineus with reduced L LE and rotational weakness contributing to inability to safely approach net multiple times as would occur in game.  Pt requests attempt at beach volleyball but reviewed she is still unable to perform on flat ground     []  See Progress Note/Recertification   Patient will continue to benefit from skilled PT services to modify and progress therapeutic interventions, address strength deficits, analyze and cue movement patterns, analyze and modify body mechanics/ergonomics and instruct in home and community integration to attain remaining goals. Progress toward goals / Updated goals:    Pt agreed to d/c in the next 1-2 sessions as chief limitations to return to sport is power.       PLAN  []  Upgrade activities as tolerated yes Continue plan of care   []  Discharge due to :    []  Other:      Therapist: Vinnie Morales, PT    Date: 7/10/2018 Time: 4:23 PM     Future Appointments  Date Time Provider Candido Mccarty   7/10/2018 5:30 PM Vinnie Morales PT [address]   7/24/2018 6:00 PM Lucretia Wills, PT [address]   7/30/2018 10:00 AM Lucretia Wills, PT [address]

## 2018-07-24 ENCOUNTER — HOSPITAL ENCOUNTER (OUTPATIENT)
Dept: PHYSICAL THERAPY | Age: 28
Discharge: HOME | End: 2018-07-24
Payer: OTHER GOVERNMENT

## 2018-07-24 PROCEDURE — 97110 THERAPEUTIC EXERCISES: CPT

## 2018-07-24 PROCEDURE — 97530 THERAPEUTIC ACTIVITIES: CPT

## 2018-07-30 ENCOUNTER — HOSPITAL ENCOUNTER (OUTPATIENT)
Dept: PHYSICAL THERAPY | Age: 28
Discharge: HOME | End: 2018-07-30
Payer: OTHER GOVERNMENT

## 2018-07-30 PROCEDURE — 97110 THERAPEUTIC EXERCISES: CPT

## 2018-07-30 NOTE — PROGRESS NOTES
[address], [address] [place] [address] - Phone: [phone]  Fax: [phone] SUMMARY  Patient Name: Marcia Orellana : 1990   Treatment/Medical Diagnosis: Left knee pain [M25.562]   Referral Source: Chelo Bernal,*     Date of Initial Visit: 17 Attended Visits: 41 Missed Visits: 0     SUMMARY OF TREATMENT  Patient has attended 40 follow up visits since her initial evaluation on 17. Patient has received therapeutic exercise, manual therapy and modalities in order to improve L knee ROM, mobility, strength, stability and proprioception following L ACLR. CURRENT STATUS  Patient demonstrates excellent progress in PT since her initial evaluation. Patient has denied pain or discomfort over the past several weeks as she has consistently performed a strength training and plyometric program independently. Patient demonstrates full, pain free L knee AROM and no deficits with walking or running mechanics. Objective measurements as follows: single hop R 61 in, L 63 in; triple hop R 198 in, L 198 in; cross hop R 167 in, L 184 in. Patient demonstrates an equal Y-balance test to both LE's and is able to perform an equal and symmetrical squat. At this time, patient is appropriate to transition to DC and has been instructed to continue with her program independently. Goal/Measure of Progress Goal Met? 1. Pt will be able to perform volleyball drills including cutting asymptompatically in order to return to sport   Status at last Eval:  difficulty decelerating and single leg drive and push off Current Status: Able to complete all drills yes   2. Pt will be I with high level hep in order ot prepare for    Status at last Eval: Still requires cues Current Status: Independent yes   RECOMMENDATIONS  Discontinue therapy. Progressing towards or have reached established goals.     If you have any questions/comments please contact us directly at [phone]. Thank you for allowing us to assist in the care of your patient. Therapist Signature:  Oscar Seth PT Date: 7/30/18     Time: 11:45 AM

## 2018-07-30 NOTE — PROGRESS NOTES
PHYSICAL THERAPY - DAILY TREATMENT NOTE    Patient Name: Lian Magana        Date: 2018  : 1990   YES Patient  Verified  Visit #:   39   of   41  Insurance: Payor: KIMO / Plan: Marquis Stein 74 / Product Type:  /      In time: 959 Out time: 1100   Total Treatment Time: 61     Medicare Time Tracking (below)   Total Timed Codes (min):  na 1:1 Treatment Time:  na     TREATMENT AREA =  Left knee pain [M25.562]    SUBJECTIVE  Pain Level (on 0 to 10 scale):  0  / 10   Medication Changes/New allergies or changes in medical history, any new surgeries or procedures? NO    If yes, update Summary List   Subjective Functional Status/Changes:  []  No changes reported     Patient reports significant improvement in symptoms since the start of PT. Patient feels she is able to continue with her program independently at this time. OBJECTIVE  Modalities Rationale:     decrease inflammation and decrease pain to improve patient's ability to perform work activities.     min [] Estim, type/location:                                      []  att     []  unatt     []  w/US     []  w/ice    []  w/heat    min []  Mechanical Traction: type/lbs                   []  pro   []  sup   []  int   []  cont    []  before manual    []  after manual    min []  Ultrasound, settings/location:      min []  Iontophoresis w/ dexamethasone, location:                                               []  take home patch       []  in clinic   10 min [x]  Ice     []  Heat    location/position: To L knee in long sit    min []  Vasopneumatic Device, press/temp:     min []  Other:    [x] Skin assessment post-treatment (if applicable):    [x]  intact    []  redness- no adverse reaction     []redness - adverse reaction:        51 min Therapeutic Exercise:  [x]  See flow sheet   Rationale:      increase ROM, increase strength, improve coordination, improve balance and increase proprioception to improve the patients ability to perform athletic activities. min Patient Education:  YES  Reviewed HEP   []  Progressed/Changed HEP based on: Other Objective/Functional Measures:    See DC note     Post Treatment Pain Level (on 0 to 10) scale:   0  / 10     ASSESSMENT  Assessment/Changes in Function:     See DC note           Progress toward goals / Updated goals:    See DC note     PLAN  []  Upgrade activities as tolerated NO Continue plan of care   [x]  Discharge due to : Met all goals   []  Other:      Therapist: Balbina Diaz PT    Date: 7/30/2018 Time: 11:52 AM     No future appointments.

## 2019-05-15 ENCOUNTER — IMPORTED ENCOUNTER (OUTPATIENT)
Dept: URBAN - METROPOLITAN AREA CLINIC 1 | Facility: CLINIC | Age: 29
End: 2019-05-15

## 2019-05-15 PROBLEM — H52.13: Noted: 2019-05-15

## 2019-05-15 PROCEDURE — S0621 ROUTINE OPHTHALMOLOGICAL EXA: HCPCS

## 2019-05-15 NOTE — PATIENT DISCUSSION
1. Myopia OU- MRx for glasses given CL trial for OS lens given (Pt had only been wearing CL OD) Due to new vision complaint will  trial new MRx. 2. Congenital Cataract OS- observe. 3. GPC OU- observe. 4. H/o Allergic Conjunctivitis OU- controlled. Cont Zaditor BID OU PRN. Return for an appointment in 1 week cc with Dr. Joni Schaumann. ***After patient arrived in [facility] patient refuses to return for 1 week CC. Per RBF ok to call and request CL Rx once she tries new trials given. Return for an appointment in 1 yr 40/cc with Dr. Joni Schaumann.

## 2019-06-11 DIAGNOSIS — M54.10 RADICULAR LEG PAIN: Primary | ICD-10-CM

## 2019-06-24 ENCOUNTER — HOSPITAL ENCOUNTER (OUTPATIENT)
Dept: PHYSICAL THERAPY | Age: 29
Discharge: HOME | End: 2019-06-24
Payer: OTHER GOVERNMENT

## 2019-06-24 PROCEDURE — 97110 THERAPEUTIC EXERCISES: CPT

## 2019-06-24 PROCEDURE — 97161 PT EVAL LOW COMPLEX 20 MIN: CPT

## 2019-06-24 NOTE — PROGRESS NOTES
[address], [address] [place] [address] - Phone: [phone]  Fax: [phone] / 2[address] [facility]  Patient Name: Jimbo Woods : 1990   Medical   Diagnosis: Pain in left leg [M79.605] Treatment Diagnosis: L LE pain, L L/S Pain   Onset Date: 2.5 months prior to IE     Referral Source: DENISA Kent Start of Care [facility]): 2019   Prior Hospitalization: See medical history Provider #: [id number]   Prior Level of Function: I with ADL's, No pain with recreational lifting, volleyball   Comorbidities: Hx of L ACL repair   Medications: Verified on Patient Summary List   The Plan of Care and following information is based on the information from the initial evaluation.   ==================================================================================  Assessment / key information:   Jimbo Woods is a 33 y.o.  yo female with c/o pain in the L side of the lower back and down the posterior aspect of the L leg. Pt reports performing heavy front squats at the gym and feeling a pain in the L side of her back following the lift. Pt reports waking up with pain, stiffness, and N/T down the L LE to the back of the knee the next day. Pt reports pain has decreased over the last few weeks with use of medication and stretches. Pt reports N/T has subsided with radicular pain described as \"deep ache\" in the L HS, glute, and piriformis. Pt denies any red flags or changes in bowel or bladder functional at this time. Pt has been able to play volleyball and has decreased amount of lifting performed at the gym. Pt rates pain as 6/10 max, 0/10 at best.  Pain better with stretching and resting, worse with lifting heavy. Objective: FOTO score = 99 (an established functional score where 100 = no disability). Posture WNL with mild decreased in L/S lordosis.  Pelvic alignment and leg length symmetrical at this time. Gait WNL. Palpation TTP and tightness noted along the L QL, L/S paraspinals, glute med, piriformis, and HS. AROM of the L/S WNL in all directions with no pain. Strength: gross LE strength 5/5 BL. Mild decreased in core strength and stability noted with bridging and planks. Repeated movement testing negative in all directions. Functional limitations at this time include difficulty with lifting heavy, squatting, running jumping. The patient was instructed in a home exercise program to address the above findings/deficits. The patient would benefit from skilled physical therapy at this time to address the above functional deficits.   ==================================================================================  Eval Complexity: History LOW Complexity : Zero comorbidities / personal factors that will impact the outcome / POC;  Examination  HIGH Complexity : 4+ Standardized tests and measures addressing body structure, function, activity limitation and / or participation in recreation ; Presentation MEDIUM Complexity : Evolving with changing characteristics ;   Decision Making LOW Complexity : FOTO score of ; Overall Complexity LOW   Problem List: pain affecting function, decrease ADL/ functional abilitiies, decrease activity tolerance and decrease flexibility/ joint mobility   Treatment Plan may include any combination of the following: Therapeutic exercise, Therapeutic activities, Neuromuscular re-education, Physical agent/modality, Gait/balance training, Manual therapy, Patient education, Self Care training and Functional mobility training  Patient / Family readiness to learn indicated by: asking questions, trying to perform skills and interest  Persons(s) to be included in education: patient (P)   Barriers to Learning/Limitations: None  Measures taken, if barriers to learning:    Patient Goal (s): Resolution of discomfort, form assessment   Patient self reported health status: good  Rehabilitation Potential: good   Short Term Goals: To be accomplished in  2  treatments:  Pt will be I with HEP in order to improve tolerance with recreational gym activities      [address]: To be accomplished in  4-6  treatments:  Pt will be able to perform full gym routine with no reports of pain in order to return to PLOF. Pt will rate max pain <4/10 in the L/S and L LE in order to improve tolerance with gym related activities. Pt will be able to perform front squats with proper form and technique in order to resume full gym activities. Frequency / Duration:   Patient to be seen  1-2  times per week for 6-8  treatments:  Patient / Caregiver education and instruction: exercises  G-Codes (GP): elizabeth  Therapist Signature: Chano Reyes PT, DPT   Date: 5/57/3044   Certification Period: NA Time: 4:38 PM   ==================================================================================  I certify that the above Physical Therapy Services are being furnished while the patient is under my care. I agree with the treatment plan and certify that this therapy is necessary. Physician Signature:        Date:       Time:     Please sign and return to [facility] at [facility], [facility]. or you may fax the signed copy to [phone]. Thank you.

## 2019-06-24 NOTE — PROGRESS NOTES
PHYSICAL THERAPY - DAILY TREATMENT NOTE    Patient Name: Brianne Michel        Date: 2019  : 1990   yes Patient  Verified  Visit #:   1     Insurance: Payor:  / Plan: Janak Marquez / Product Type:  /      In time: 550 Out time: 630   Total Treatment Time: 40     Medicare/[employer] Time Tracking (below)   Total Timed Codes (min):  na 1:1 Treatment Time:  na     TREATMENT AREA =  Pain in left leg [M79.605]    SUBJECTIVE  Pain Level (on 0 to 10 scale):  0  / 10   Medication Changes/New allergies or changes in medical history, any new surgeries or procedures?    no  If yes, update Summary List   Subjective Functional Status/Changes:  []  No changes reported     SEE POC         OBJECTIVE    30 min Evaluation     10 min Therapeutic Exercise:  [x]  See flow sheet   Rationale:      increase ROM and increase strength to improve the patients ability to perform functional ADL's     Billed With/As:   [] TE   [] TA   [] Neuro   [] Self Care Patient Education: [x] Review HEP    [] Progressed/Changed HEP based on:   [] positioning   [] body mechanics   [] transfers   [] heat/ice application    [] other:      Other Objective/Functional Measures:    SEE POC     Post Treatment Pain Level (on 0 to 10) scale:   0  / 10     ASSESSMENT  Assessment/Changes in Function:     Evaluation Code Complexity: History LOW Complexity : Zero comorbidities / personal factors that will impact the outcome / POC; Examination HIGH Complexity : 4+ Standardized tests and measures addressing body structure, function, activity limitation and / or participation in recreation ; Presentation MEDIUM Complexity : Evolving with changing characteristics ;  Decision Making LOW Complexity : FOTO score of ; Complexity LOW     Justification for Eval Code Complexity:  Patient History : None  Examination SEE ABOVE EXAM  Clinical Presentation: evolving pain  Clinical Decision Making : YXOU 80         []  See Progress Note/Recertification   Patient will continue to benefit from skilled PT services to modify and progress therapeutic interventions, address functional mobility deficits, address ROM deficits, address strength deficits, analyze and address soft tissue restrictions, analyze and cue movement patterns, analyze and modify body mechanics/ergonomics and assess and modify postural abnormalities to attain remaining goals. Progress toward goals / Updated goals:    SEE POC     PLAN  []  Upgrade activities as tolerated yes Continue plan of care   []  Discharge due to :    [x]  Other: Pt will be seen 1-2 times per week for 8 sessions.       Therapist: Hadley Joyce PT, DPT    Date: 6/24/2019 Time: 4:38 PM     Future Appointments   Date Time Provider Candido Mccarty   6/24/2019  6:00 PM Dung Gan, PT [facility]

## 2019-07-02 ENCOUNTER — HOSPITAL ENCOUNTER (OUTPATIENT)
Dept: PHYSICAL THERAPY | Age: 29
Discharge: HOME | End: 2019-07-02
Payer: OTHER GOVERNMENT

## 2019-07-02 PROCEDURE — 97110 THERAPEUTIC EXERCISES: CPT

## 2019-07-02 PROCEDURE — 97140 MANUAL THERAPY 1/> REGIONS: CPT

## 2019-07-02 NOTE — PROGRESS NOTES
PHYSICAL THERAPY - DAILY TREATMENT NOTE    Patient Name: Taunya Olszewski        Date: 2019  : 1990   yes Patient  Verified  Visit #:   2   of   -  Insurance: Payor: KIMO / Plan: Marquis Stein 74 / Product Type:  /      In time: 528 Out time: 624   Total Treatment Time: 56     Medicare/[employer]BS Time Tracking (below)   Total Timed Codes (min):  na 1:1 Treatment Time:  na     TREATMENT AREA =  Pain in left leg [M79.605]    SUBJECTIVE  Pain Level (on 0 to 10 scale):  1  / 10   Medication Changes/New allergies or changes in medical history, any new surgeries or procedures?    no  If yes, update Summary List   Subjective Functional Status/Changes:  []  No changes reported     Patient states she has been trying to take it easy at the gym and hasn't been performing heavy lifting with her legs. Patient continues to experience pain on the outside portion of her L hip and down the outside of her thigh. OBJECTIVE    41 min Therapeutic Exercise:  [x]  See flow sheet   Rationale:      increase ROM and increase strength to improve the patients ability to perform recreational activities. 15 min Manual Therapy: STM to L piriformis, L glute med, L TFL; MET correction for L anterior innominate. Rationale:      decrease pain, increase ROM, increase tissue extensibility and decrease trigger points to improve patient's ability to perform walking activities. Billed With/As:   [x] TE   [] TA   [] Neuro   [] Self Care Patient Education: [x] Review HEP    [] Progressed/Changed HEP based on:   [] positioning   [] body mechanics   [] transfers   [] heat/ice application    [] other:      Other Objective/Functional Measures:    Assessed front squat form: patient demonstrating good technique with bar only, progressed to 75# and patient demonstrating slight weight shift to the R with pelvic rotation compensation after 3-4 reps.    Cued on pelvic alignment during birddog exercise     Post Treatment Pain Level (on 0 to 10) scale:   0  / 10     ASSESSMENT  Assessment/Changes in Function:     Patient denied changes in symptoms post session. Patient would benefit from continued PT in order to further address core and pelvic strength/stability deficits for reduced compensatory movements during exercise. []  See Progress Note/Recertification   Patient will continue to benefit from skilled PT services to modify and progress therapeutic interventions, address functional mobility deficits, address ROM deficits, address strength deficits, analyze and address soft tissue restrictions, analyze and cue movement patterns, analyze and modify body mechanics/ergonomics and assess and modify postural abnormalities to attain remaining goals.    Progress toward goals / Updated goals:    Progressing with STG#1     PLAN  []  Upgrade activities as tolerated yes Continue plan of care   []  Discharge due to :    []  Other:      Therapist: Carolyn Yan PT    Date: 7/2/2019 Time: 6:52 PM     Future Appointments   Date Time Provider Candido Mccarty   7/9/2019  5:30 PM Tegan Mejia [facility]   7/19/2019  8:30 AM Darshana Albrecht PT [facility]   7/23/2019  5:00 PM Darshana Albrecht PT [facility]

## 2019-07-09 ENCOUNTER — HOSPITAL ENCOUNTER (OUTPATIENT)
Dept: PHYSICAL THERAPY | Age: 29
Discharge: HOME | End: 2019-07-09
Payer: OTHER GOVERNMENT

## 2019-07-09 PROCEDURE — 97140 MANUAL THERAPY 1/> REGIONS: CPT | Performed by: PHYSICAL THERAPIST

## 2019-07-09 PROCEDURE — 97110 THERAPEUTIC EXERCISES: CPT | Performed by: PHYSICAL THERAPIST

## 2019-07-09 NOTE — PROGRESS NOTES
[facility] PHYSICAL THERAPY - DAILY TREATMENT NOTE    Patient Name: Samantha Lu        Date: 2019  : 1990   yes Patient  Verified  Visit #:   3   of     Insurance: Payor: KIMO / Plan: Marquis Stein 74 / Product Type:  /      In time: 540 Out time: 622   Total Treatment Time: 42     Medicare/BCBS Time Tracking (below)   Total Timed Codes (min):  na 1:1 Treatment Time:  na     TREATMENT AREA =  Pain in left leg [M79.840]    SUBJECTIVE  Pain Level (on 0 to 10 scale):  1  / 10   Medication Changes/New allergies or changes in medical history, any new surgeries or procedures?    no  If yes, update Summary List   Subjective Functional Status/Changes:  []  No changes reported     I have been stretching my piriformis out like crazy and I still cannot get anymore relief           OBJECTIVE    25 min Therapeutic Exercise:  [x]  See flow sheet   Rationale:      increase ROM and increase strength to improve the patients ability to complete adls     17 min Manual Therapy: dtm glute med/hs/obturator    Rationale:      decrease pain, increase ROM and increase tissue extensibility to improve patient's ability to squat        Billed With/As:   [] TE   [] TA   [] Neuro   [] Self Care Patient Education: [x] Review HEP    [] Progressed/Changed HEP based on:   [] positioning   [] body mechanics   [] transfers   [] heat/ice application    [] other:      Other Objective/Functional Measures:    Reviewed current hep/te with pt and performing hip thrust/hip extension with poor glute max firing pain, required modification to foot position during thrust  ttp along obt.  With resisted ir reproducing pain      Post Treatment Pain Level (on 0 to 10) scale:   0  / 10     ASSESSMENT  Assessment/Changes in Function:     No increase in pain following session - pt was advised to hold off on all single leg activities due to above findings     []  See Progress Note/Recertification   Patient will continue to benefit from skilled PT services to modify and progress therapeutic interventions, address functional mobility deficits, address ROM deficits, address strength deficits, analyze and address soft tissue restrictions, analyze and cue movement patterns, analyze and modify body mechanics/ergonomics, assess and modify postural abnormalities and instruct in home and community integration to attain remaining goals. Progress toward goals / Updated goals:     Will assess response to change in current program      PLAN  []  Upgrade activities as tolerated yes Continue plan of care   []  Discharge due to :    []  Other:      Therapist: Magaly Hodges PT    Date: 7/9/2019 Time: 6:41 PM     Future Appointments   Date Time Provider Candido Mccarty   7/19/2019  8:30 AM Babar Mendieta, PT [facility]   7/23/2019  5:00 PM Babar Mendieta, PT [facility]

## 2019-07-23 ENCOUNTER — HOSPITAL ENCOUNTER (OUTPATIENT)
Dept: PHYSICAL THERAPY | Age: 29
Discharge: HOME | End: 2019-07-23
Payer: OTHER GOVERNMENT

## 2019-07-23 PROCEDURE — 97110 THERAPEUTIC EXERCISES: CPT | Performed by: PHYSICAL THERAPIST

## 2019-07-23 PROCEDURE — 97140 MANUAL THERAPY 1/> REGIONS: CPT | Performed by: PHYSICAL THERAPIST

## 2019-07-23 NOTE — PROGRESS NOTES
[facility] PHYSICAL THERAPY - DAILY TREATMENT NOTE    Patient Name: Antony Delay        Date: 2019  : 1990   yes Patient  Verified  Visit #:   4   of   -8  Insurance: Payor: KIMO / Plan: Marquis Stein 74 / Product Type:  /      In time: 502 Out time: 600   Total Treatment Time: 50     Medicare/BCBS Time Tracking (below)   Total Timed Codes (min):  na 1:1 Treatment Time:  na     TREATMENT AREA =  Pain in left leg [M79.491]    SUBJECTIVE  Pain Level (on 0 to 10 scale):  1  / 10   Medication Changes/New allergies or changes in medical history, any new surgeries or procedures?    no  If yes, update Summary List   Subjective Functional Status/Changes:  []  No changes reported     Its definitely movement related.  I will feel it sometimes other times I wont - for instance I could clean at gym but I have pain when I go to pivot or push off on it I feel it          OBJECTIVE    25 min Therapeutic Exercise:  [x]  See flow sheet   Rationale:      increase ROM and increase strength to improve the patients ability to complete adls     25 min Manual Therapy: dtm glute med/hs/obturator/ls paraspinals, l4/5 rr correction, anterior hip mob, hip ir/er stretch, sacral RR mob   Rationale:      decrease pain, increase ROM and increase tissue extensibility to improve patient's ability to squat        Billed With/As:   [] TE   [] TA   [] Neuro   [] Self Care Patient Education: [x] Review HEP    [] Progressed/Changed HEP based on:   [] positioning   [] body mechanics   [] transfers   [] heat/ice application    [] other:      Other Objective/Functional Measures:    ttp along external rotator insertion at ischial tub (performed pt prone hip er) which reproduced pt chief pain c/o   Added in multiple te as per flow sheet      Post Treatment Pain Level (on 0 to 10) scale:   0  / 10     ASSESSMENT  Assessment/Changes in Function:     No increase in pain following session but significant fatigue with L glute and rotational isolation - noted lumbar rotation compensation    []  See Progress Note/Recertification   Patient will continue to benefit from skilled PT services to modify and progress therapeutic interventions, address functional mobility deficits, address ROM deficits, address strength deficits, analyze and address soft tissue restrictions, analyze and cue movement patterns, analyze and modify body mechanics/ergonomics, assess and modify postural abnormalities and instruct in home and community integration to attain remaining goals. Progress toward goals / Updated goals:    Continue with current program to address posterior hip pain      PLAN  []  Upgrade activities as tolerated yes Continue plan of care   []  Discharge due to :    []  Other:      Therapist: Joseluis Burt, PT    Date: 7/23/2019 Time: 6:41 PM     No future appointments.

## 2019-09-10 NOTE — PROGRESS NOTES
.    [facility] [address] THERAPY  [address] [place] [address], [facility],[place], [address] - Phone: [phone]  Fax: [phone]    DISCHARGE NOTE  Patient Name: Maggie Donohue : 1990   Treatment/Medical Diagnosis: Pain in left leg [M79.605]   Referral Source: Parth Oliver     Date of Initial Visit: 19 Attended Visits: 4 Missed Visits: 1       SUMMARY OF TREATMENT  Pt attended only initial evaluation and     3     follow-ups and then did not return. Therefore a formal reassessment of goals was not performed. RECOMMENDATIONS  Discontinue physical therapy due to patient not returning. If you have any questions/comments please contact us directly at [phone]. Thank you for allowing us to assist in the care of your patient.     Therapist Signature: Tian Khan PT Date: 9/10/19     Time: 10:24 AM

## 2020-10-27 ENCOUNTER — DOCUMENTATION ONLY (OUTPATIENT)
Dept: ORTHOPEDIC SURGERY | Age: 30
End: 2020-10-27

## 2020-10-27 ENCOUNTER — OFFICE VISIT (OUTPATIENT)
Dept: ORTHOPEDIC SURGERY | Age: 30
End: 2020-10-27
Payer: OTHER GOVERNMENT

## 2020-10-27 VITALS
OXYGEN SATURATION: 99 % | SYSTOLIC BLOOD PRESSURE: 124 MMHG | HEIGHT: 69 IN | BODY MASS INDEX: 23.63 KG/M2 | DIASTOLIC BLOOD PRESSURE: 79 MMHG | TEMPERATURE: 97.7 F | HEART RATE: 67 BPM

## 2020-10-27 DIAGNOSIS — S83.242A ACUTE MEDIAL MENISCUS TEAR OF LEFT KNEE, INITIAL ENCOUNTER: ICD-10-CM

## 2020-10-27 DIAGNOSIS — M25.562 ACUTE PAIN OF LEFT KNEE: Primary | ICD-10-CM

## 2020-10-27 PROCEDURE — 99213 OFFICE O/P EST LOW 20 MIN: CPT | Performed by: PHYSICIAN ASSISTANT

## 2020-10-27 PROCEDURE — 73560 X-RAY EXAM OF KNEE 1 OR 2: CPT | Performed by: PHYSICIAN ASSISTANT

## 2020-10-27 RX ORDER — CELECOXIB 200 MG/1
200 CAPSULE ORAL 2 TIMES DAILY WITH MEALS
Qty: 60 CAP | Refills: 2 | Status: SHIPPED | OUTPATIENT
Start: 2020-10-27 | End: 2021-08-10 | Stop reason: SDUPTHER

## 2020-10-27 NOTE — PROGRESS NOTES
Madelyn Woodard  1990   Chief Complaint   Patient presents with    Knee Pain     LEFT        HISTORY OF PRESENT ILLNESS  Madelyn Woodard is a 35 y.o. female who presents today for evaluation of left knee. She states her knee has lost range of motion and has been swollen for the last week. She went hiking and was fine during hike. Woke up with inability to straighten leg. She attempted to play volleyball last night and had to stop secondary to discomfort. Patient describes the pain as sharp, throbbing and catching sensation that is Intermittent in nature. Symptoms are worse with twisting and jumping and is better with  rest.  Associated symptoms include Swelling. Since problem started, it: is unchanged. Pain does not wake patient up at night. Has taken nothing for the problem. Pain is a 3/10. Has tried following treatments: Injections:NO; Brace:NO; Therapy:NO; Cane/Crutch:NO       Allergies   Allergen Reactions    Pecan Nut Anaphylaxis and Hives    [place] Anaphylaxis and Hives        History reviewed. No pertinent past medical history.    Social History     Socioeconomic History    Marital status:      Spouse name: Not on file    Number of children: Not on file    Years of education: Not on file    Highest education level: Not on file   Occupational History    Not on file   Social Needs    Financial resource strain: Not on file    Food insecurity     Worry: Not on file     Inability: Not on file    Transportation needs     Medical: Not on file     Non-medical: Not on file   Tobacco Use    Smoking status: Never Smoker    Smokeless tobacco: Never Used   Substance and Sexual Activity    Alcohol use: Yes     Comment: rarely    Drug use: No    Sexual activity: Not on file   Lifestyle    Physical activity     Days per week: Not on file     Minutes per session: Not on file    Stress: Not on file   Relationships    Social connections     Talks on phone: Not on file     Gets together: Not on file     Attends Orthodox service: Not on file     Active member of club or organization: Not on file     Attends meetings of clubs or organizations: Not on file     Relationship status: Not on file    Intimate partner violence     Fear of current or ex partner: Not on file     Emotionally abused: Not on file     Physically abused: Not on file     Forced sexual activity: Not on file   Other Topics Concern    Not on file   Social History Narrative    Not on file      Past Surgical History:   [facility]-white [facility]    FOOT/TOES SURGERY PROC UNLISTED  2007    metatarsal reconstruction    HX ACL RECONSTRUCTION Left 11/29/2017    HX KNEE ARTHROSCOPY        Family History   Problem Relation Age of Onset    No Known Problems Mother     Arthritis-rheumatoid Father       Current Outpatient Medications   Medication Sig    celecoxib (CeleBREX) 200 mg capsule Take 1 Cap by mouth two (2) times daily (with meals).  dicyclomine (BENTYL) 20 mg tablet Take 1 Tab by mouth every six (6) hours as needed (abdominal cramps) for up to 20 doses.  ondansetron hcl (ZOFRAN) 4 mg tablet Take 1 Tab by mouth every six (6) hours as needed for Nausea. No current facility-administered medications for this visit. REVIEW OF SYSTEM   Patient denies: Weight loss, Fever/Chills, HA, Visual changes, Fatigue, Chest pain, SOB, Abdominal pain, N/V/D/C, Blood in stool or urine, Edema. Pertinent positive as above in HPI. All others were negative    PHYSICAL EXAM:   Visit Vitals  /79   Pulse 67   Temp 97.7 °F (36.5 °C)   Ht 5' 9\" (1.753 m)   SpO2 99%   BMI 23.63 kg/m²     The patient is a well-developed, well-nourished female   in no acute distress. The patient is alert and oriented times three. The patient is alert and oriented times three.  Mood and affect are normal.  LYMPHATIC: lymph nodes are not enlarged and are within normal limits  SKIN: normal in color and non tender to palpation. There are no bruises or abrasions noted. NEUROLOGICAL: Motor sensory exam is within normal limits. Reflexes are equal bilaterally. There is normal sensation to pinprick and light touch  MUSCULOSKELETAL:  Examination Left knee   Skin Intact   Range of motion 0-130   Effusion -   Medial joint line tenderness -   Lateral joint line tenderness -   Tenderness Pes Bursa -   Tenderness insertion MCL -   Tenderness insertion LCL -   Jias -   Patella crepitus -   Patella grind -   Lachman -   Pivot shift -   Anterior drawer -   Posterior drawer -   Varus stress -   Valgus stress -   Neurovascular Intact   Calf Swelling and Tenderness to Palpation -   Rob's Test -   Hamstring Cord Tightness -              IMAGin view xray images of left knee taken in office on 10/27/2020 read and reviewed by myself reveal previous ACL tunnel and hardware in excellent position     IMPRESSION:      ICD-10-CM ICD-9-CM    1. Acute pain of left knee  M25.562 719.46 AMB POC XRAY, KNEE; 1/2 VIEWS   2. Acute medial meniscus tear of left knee, initial encounter  S83.242A 836.0 MRI KNEE LT WO CONT        PLAN:   1. Patient with acute swelling. Will order MRI r/o MMT. celebrex for inflammation. Cont with HEP as instructed in the office  Risk factors include: n/a  2. No cortisone injection indicated today   3. No Physical/Occupational Therapy indicated today  4. Yes diagnostic test indicated today:   5. No durable medical equipment indicated today  6. No referral indicated today   7. YES medications indicated today: celebrex  8.  No Narcotic indicated today     RTC after MRI     [facility]

## 2020-10-27 NOTE — PROGRESS NOTES
Patients MRI order of left knee, demographics and office note were faxed to [facility] requesting an appt. Patient is  Prime, per web site patient does not need auth.  For CPT code [id number]  Tel# [phone] fax# [phone]

## 2021-04-28 NOTE — PATIENT DISCUSSION
Discussed future YAG LASER Capsulotomy with patient. Last appt: 2/10/2021  Next appt: Visit date not found        Due to return:   Does not say in last office note    PDMP:  Last reviewed by Melinda Beck MD on 11/16/2020 at 1:09 PM          methylphenidate (CONCERTA) 27 MG extended release tablet     Take one tablet by mouth daily                [facility] [address]   [address], [address] [address]   Phone:  [phone]  Fax:  [phone]

## 2021-06-08 NOTE — PATIENT DISCUSSION
Discussed condition and exacerbating conditions/situations (e.g., dry/arid environments, overhead fans, air conditioners, side effect of medications).
Discussed future YAG LASER Capsulotomy with patient.
Discussed lid hygiene, warm compress and eyelid wash.
Monitor.
Recommended artificial tears to use: 1 drop 4x a day in both eyes.
Reviewed and discussed natural history.
No

## 2021-08-10 ENCOUNTER — OFFICE VISIT (OUTPATIENT)
Dept: ORTHOPEDIC SURGERY | Age: 31
End: 2021-08-10
Payer: OTHER GOVERNMENT

## 2021-08-10 VITALS
WEIGHT: 165.5 LBS | OXYGEN SATURATION: 97 % | HEIGHT: 69 IN | HEART RATE: 40 BPM | BODY MASS INDEX: 24.51 KG/M2 | TEMPERATURE: 97.5 F

## 2021-08-10 DIAGNOSIS — M25.561 ACUTE PAIN OF RIGHT KNEE: Primary | ICD-10-CM

## 2021-08-10 DIAGNOSIS — S83.411A SPRAIN OF MEDIAL COLLATERAL LIGAMENT OF RIGHT KNEE, INITIAL ENCOUNTER: ICD-10-CM

## 2021-08-10 PROCEDURE — 99214 OFFICE O/P EST MOD 30 MIN: CPT | Performed by: PHYSICIAN ASSISTANT

## 2021-08-10 PROCEDURE — 73560 X-RAY EXAM OF KNEE 1 OR 2: CPT | Performed by: PHYSICIAN ASSISTANT

## 2021-08-10 RX ORDER — CELECOXIB 200 MG/1
200 CAPSULE ORAL 2 TIMES DAILY WITH MEALS
Qty: 60 CAPSULE | Refills: 2 | Status: SHIPPED | OUTPATIENT
Start: 2021-08-10

## 2021-08-10 NOTE — PROGRESS NOTES
Sofy Arora  1990   Chief Complaint   Patient presents with    Knee Pain     right, injury, pain is 1/10 but is more severe with changing directions        HISTORY OF PRESENT ILLNESS  Sofy Arora is a 28 y.o. female who presents today for reevaluation of right knee pain. This started on 8/7 during a grass volleyball tournament. Pain with changing directions. She notes when the injury first started she did feel a pop in her knee and felt like her leg gave way on her. It has improved some however she still has pain with moving side to side. This is a sharp stabbing pain. She has been taking ibuprofen and using an Ace wrap for compression. Pain is a 1/10. Patient denies any fever, chills, chest pain, shortness of breath or calf pain. The remainder of the review of systems is negative. There are no new illness or injuries to report since last seen in the office. No changes in medications, allergies, social or family history. PHYSICAL EXAM:   Visit Vitals  Pulse (!) 40   Temp 97.5 °F (36.4 °C)   Ht 5' 9\" (1.753 m)   Wt 165 lb 8 oz (75.1 kg)   SpO2 97%   BMI 24.44 kg/m²     The patient is a well-developed, well-nourished female   in no acute distress. The patient is alert and oriented times three. The patient is alert and oriented times three. Mood and affect are normal.  LYMPHATIC: lymph nodes are not enlarged and are within normal limits  SKIN: normal in color and non tender to palpation. There are no bruises or abrasions noted. NEUROLOGICAL: Motor sensory exam is within normal limits. Reflexes are equal bilaterally.  There is normal sensation to pinprick and light touch  MUSCULOSKELETAL:  Examination Right knee   Skin Intact   Range of motion 0-130   Effusion -   Medial joint line tenderness +   Lateral joint line tenderness -   Tenderness Pes Bursa -   Tenderness insertion MCL +   Tenderness insertion LCL -   Jias -   Patella crepitus -   Patella grind -   Lachman -   Pivot shift -   Anterior drawer -   Posterior drawer -   Varus stress -   Valgus stress +1   Neurovascular Intact   Calf Swelling and Tenderness to Palpation -   Rob's Test -   Hamstring Cord Tightness -         IMAGIN view x-ray standing of right knee read and reviewed by myself taken in the office on 8/10/2021 reveal no acute abnormalities    IMPRESSION:      ICD-10-CM ICD-9-CM    1. Acute pain of right knee  M25.561 719.46 AMB POC XRAY, KNEE; 1/2 VIEWS      CANCELED: AMB POC X-RAY KNEE 3 VIEW        PLAN:   1. Patient with acute right knee MCL sprain. We will place her on Celebrex. We will order an MCL brace for her. We will also refer her to physical therapy. If pain persist despite conservative measures we will order an MRI to rule out a medial meniscus tear  Risk factors include: N/A  2. No cortisone injection indicated today   3. Yes Physical/Occupational Therapy indicated today  4. No diagnostic test indicated today:   5. Yes durable medical equipment indicated today  6. No referral indicated today   7. Yes medications indicated today:   8.  No Narcotic indicated today  RTC 4 weeks       TAWNY [facility]

## 2021-08-24 ENCOUNTER — HOSPITAL ENCOUNTER (OUTPATIENT)
Dept: PHYSICAL THERAPY | Age: 31
Discharge: HOME | End: 2021-08-24
Payer: OTHER GOVERNMENT

## 2021-08-24 PROCEDURE — 97161 PT EVAL LOW COMPLEX 20 MIN: CPT

## 2021-08-24 PROCEDURE — 97014 ELECTRIC STIMULATION THERAPY: CPT

## 2021-08-24 NOTE — RT PROTOCOL NOTE
[facility] PHYSICAL THERAPY AT [place]  [address] [facility], [address] [place], [address] - Phone: [phone]  Fax: [phone] / [facility]  Patient Name: Ovi Pruitt : 1990   Treatment   Diagnosis: Right knee pain Medical   Diagnosis: Pain in right knee [M25.561]   Onset Date: 2021     [facility]   Prior Hospitalization: See medical history Provider #: [id number]   Prior Level of Function: Pt was pain free with all ADLs   Comorbidities: None reported   Medications: Verified on Patient Summary List   The Plan of Care and following information is based on the information from the initial evaluation.   ==================================================================================  Assessment / key information: Pt is a 27 yo female that presents to PT with chief complaint of R knee pain that began on 2021 when she was playing grass volleyball and changed directions running. Pt reports feeling/hearing a pop when the injury happened. He/she presents with pain ranging from 2-8/10, located on the medial aspect of the R knee. Pain is made worse with only weightbearing activities and pain has diminished a lot and pt reports more medial knee instability, better with rest.  Palpation reveals TTP to medial joint line. Knee AROM/PROM: flexion 120/125, extension -4/0. LE MMT: 5/5 grossly except hip abd: 4+/5. Patellar mobility is good. Quad activation: fair compared to contralateral side. (+) Special tests include: valgus stress test (minimal laxity but no pain). (-) Special tests include: lachmans, anterior drawer, posterior drawer, thessaly, mcmurra, varus stress test. Pt shows some increase in swelling in the R knee. Pt is limited in the following activities: all high impact and higher level ADLs .  Pt demonstrates signs and symptoms consistent with possible R MCL strain. Pt will benefit from PT interventions to address the aforementioned deficits and allow pt to return to PLOF. Eval Complexity: History LOW Complexity : Zero comorbidities / personal factors that will impact the outcome / POC;  Examination  LOW Complexity : 1-2 Standardized tests and measures addressing body structure, function, activity limitation and / or participation in recreation ; Presentation LOW Complexity : Stable, uncomplicated ;  Decision Making MEDIUM Complexity : FOTO score of 26-74; Overall Complexity LOW    ==================================================================================  Problem List: pain affecting function, decrease ROM, decrease strength, impaired gait/ balance, decrease ADL/ functional abilitiies, decrease activity tolerance and decrease flexibility/ joint mobility   Treatment Plan may include any combination of the following: Therapeutic exercise, Therapeutic activities, Neuromuscular re-education, Physical agent/modality, Gait/balance training, Manual therapy, Patient education, Self Care training, Functional mobility training and Home safety training  Patient / Family readiness to learn indicated by: asking questions, trying to perform skills and interest  Persons(s) to be included in education: patient (P)  Barriers to Learning/Limitations: no  Measures taken, if barriers to learning:    Patient Goal (s): \"return to sport\"   Patient self reported health status: excellent  Rehabilitation Potential: good   Short Term Goals: To be accomplished in  3  weeks:  1. Pt will be independent and compliant with HEP to decrease pain, increase ROM and return pt to PLOF. 2. Pt will demonstrate a GROC score of >/= +2 to show overall improvement in function     Long Term Goals: To be accomplished in  6  weeks:  1. Increase score on FOTO to > or = to 85 points to demo an increase in functional activity tolerance with the LE.    2. Pt will note < or = 2/10 pain with all mobility to improve comfort with ADLs. 3. Pt will demonstrate a GROC score of >/= +5 to show overall improvement in function  4. Pt will be able to run and perform lateral movements with no pain in order to return to sport. Frequency / Duration:   Patient to be seen  1  times per week for 6  weeks:  Patient / Caregiver education and instruction: self care, activity modification and exercises  Therapist Signature: Harika Grover PT Date: 7/14/6871   Certification Period: - Time: 2:00 PM   ===========================================================================================  I certify that the above Physical Therapy Services are being furnished while the patient is under my care. I agree with the treatment plan and certify that this therapy is necessary. Physician Signature:        Date:       Time:        DENISA Velazquez    Please sign and return to [facility] or you may fax the signed copy to [phone]. Thank you.

## 2021-09-01 ENCOUNTER — HOSPITAL ENCOUNTER (OUTPATIENT)
Dept: PHYSICAL THERAPY | Age: 31
Discharge: HOME | End: 2021-09-01
Payer: OTHER GOVERNMENT

## 2021-09-01 PROCEDURE — 97110 THERAPEUTIC EXERCISES: CPT

## 2021-09-01 PROCEDURE — 97014 ELECTRIC STIMULATION THERAPY: CPT

## 2021-09-01 PROCEDURE — 97112 NEUROMUSCULAR REEDUCATION: CPT

## 2021-09-01 PROCEDURE — 97530 THERAPEUTIC ACTIVITIES: CPT

## 2021-09-01 NOTE — PROGRESS NOTES
PHYSICAL THERAPY - DAILY TREATMENT NOTE    Patient Name: Fritz Randle        Date: 2021  : 1990    Patient  Verified: YES  Visit #:   2   of   12  Insurance: Payor:  / Plan: Marquis Stein 74 / Product Type:  /      In time: 5:10 Out time: 6:20   Total Treatment Time: 79     Medicare Time Tracking (below)   Total Timed Codes (min):  - 1:1 Treatment Time:  -     TREATMENT AREA/ DIAGNOSIS = Pain in right knee [M25.561]    SUBJECTIVE  Pain Level (on 0 to 10 scale):  0  / 10   Medication Changes/New allergies or changes in medical history, any new surgeries or procedures? NO    If yes, update Summary List   Subjective Functional Status/Changes:  []  No changes reported     Pt reports that she has been working out like normal but doing less reps. Pt states she is also wearing the brace when working out. OBJECTIVE  Modalities Rationale:     decrease inflammation, decrease pain and increase muscle contraction/control to improve patient's ability to perform ADLs without pain  10 min [x] Estim, type/location:  [place]                                     []  att     []  unatt     []  w/US     []  w/ice    []  w/heat    min []  Mechanical Traction: type/lbs                   []  pro   []  sup   []  int   []  cont    []  before manual    []  after manual    min []  Ultrasound, settings/location:      min []  Iontophoresis w/ dexamethasone, location:                                               []  take home patch       []  in clinic   10 min [x]  Ice     []  Heat    location/position: R knee.     min []  Vasopneumatic Device, press/temp:     min []  Other:    [x] Skin assessment post-treatment (if applicable):    [x]  intact    []  redness- no adverse reaction     []redness  adverse reaction:        25 min Therapeutic Exercise:  [x]  See flow sheet   Rationale:      increase ROM, increase strength and improve coordination to improve the patients ability to perform ADLs without pain       10 min Therapeutic Activity: [x]  See flow sheet   Rationale:    functional activities to improve the patients ability to perform ADLs without pain      15 min Neuromuscular Re-ed: [x]  See flow sheet   Rationale:    improve balance and increase proprioception to improve the patients ability to perform ADLs without pain    Billed With/As:   [] TE   [] TA   [] Neuro   [] Self Care Patient Education: [x] Review HEP    [] Progressed/Changed HEP based on:   [] positioning   [] body mechanics   [] transfers   [] heat/ice application    [] other:        Other Objective/Functional Measures:    AROM flexion: 131  Extension: -3deg   Post Treatment Pain Level (on 0 to 10) scale:   0  / 10     ASSESSMENT  Assessment/Changes in Function:     Pt showing good medial/lateral control with exercises today. Good response to care with no increase in pain     []  See Progress Note/Recertification   Patient will continue to benefit from skilled PT services to modify and progress therapeutic interventions, address functional mobility deficits, address ROM deficits, address strength deficits, analyze and address soft tissue restrictions and analyze and cue movement patterns to attain remaining goals.    Progress toward goals / Updated goals:    Good Progress to    [] STG    [x] LTG  1 as shown by improved pain levels with ADLs      PLAN  [x]  Upgrade activities as tolerated YES Continue plan of care   []  Discharge due to :    []  Other:      Therapist: Stephenie Bradley DPT     Date: 9/1/2021 Time: 6:39 PM        Future Appointments   Date Time Provider Candido Mccarty   9/15/2021  4:30 PM Jung Merritt [facility]   9/29/2021  5:15 PM Jordon Donohue [facility]

## 2021-09-15 ENCOUNTER — APPOINTMENT (OUTPATIENT)
Dept: PHYSICAL THERAPY | Age: 31
End: 2021-09-15
Payer: OTHER GOVERNMENT

## 2021-09-29 ENCOUNTER — HOSPITAL ENCOUNTER (OUTPATIENT)
Dept: PHYSICAL THERAPY | Age: 31
Discharge: HOME | End: 2021-09-29
Payer: OTHER GOVERNMENT

## 2021-09-29 PROCEDURE — 97535 SELF CARE MNGMENT TRAINING: CPT

## 2021-09-29 PROCEDURE — 97112 NEUROMUSCULAR REEDUCATION: CPT

## 2021-09-29 PROCEDURE — 97530 THERAPEUTIC ACTIVITIES: CPT

## 2021-09-29 PROCEDURE — 97110 THERAPEUTIC EXERCISES: CPT

## 2021-09-29 NOTE — PROGRESS NOTES
PHYSICAL THERAPY - DAILY TREATMENT NOTE    Patient Name: Jessica Jolly        Date: 2021  : 1990    Patient  Verified: YES  Visit #:   3   of   12  Insurance: Payor: KIMO / Plan: Marquis Stein 74 / Product Type:  /      In time: 5:10 Out time: 6:15   Total Treatment Time: 72     Medicare Time Tracking (below)   Total Timed Codes (min):  - 1:1 Treatment Time:  -     TREATMENT AREA/ DIAGNOSIS = Pain in right knee [M25.561]    SUBJECTIVE   Pain Level (on 0 to 10 scale):  0  / 10   Medication Changes/New allergies or changes in medical history, any new surgeries or procedures?     NO    If yes, update Summary List   Subjective Functional Status/Changes:  []  No changes reported     See PN      OBJECTIVE  Modalities Rationale:     decrease inflammation and decrease pain to improve patient's ability to perform ADLs without pain   min [] Estim, type/location:                                      []  att     []  unatt     []  w/US     []  w/ice    []  w/heat    min []  Mechanical Traction: type/lbs                   []  pro   []  sup   []  int   []  cont    []  before manual    []  after manual    min []  Ultrasound, settings/location:      min []  Iontophoresis w/ dexamethasone, location:                                               []  take home patch       []  in clinic   10 min [x]  Ice     []  Heat    location/position:     min []  Vasopneumatic Device, press/temp:    If using vaso (only need to measure limb vaso being performed on)      pre-treatment girth :       post-treatment girth :       measured at (landmark location) :      min []  Other:    [] Skin assessment post-treatment (if applicable):    []  intact    []  redness- no adverse reaction                  []redness  adverse reaction:        15 min Therapeutic Exercise:  [x]  See flow sheet   Rationale:      increase strength and improve coordination to improve the patients ability to perform ADLs without pain       15 min Therapeutic Activity: [x]  See flow sheet   Rationale:    functional activities to improve the patients ability to perform ADLs without pain    15 min Neuromuscular Re-ed: [x]  See flow sheet   Rationale:    improve coordination, improve balance and increase proprioception to improve the patients ability to perform ADLs without pain    10 min Self Care: Education on progression of current program. Return to play criterion. Rationale:    education to improve the patients ability to self manage symptoms     Billed With/As:   [x] TE   [] TA   [] Neuro   [] Self Care Patient Education: [x] Review HEP    [] Progressed/Changed HEP based on:   [] positioning   [] body mechanics   [] transfers   [] heat/ice application    [] other:        Other Objective/Functional Measures:    See PN   Post Treatment Pain Level (on 0 to 10) scale:   0  / 10     ASSESSMENT  Assessment/Changes in Function:     See PN     []  See Progress Note/Recertification   Patient will continue to benefit from skilled PT services to modify and progress therapeutic interventions, address functional mobility deficits, address ROM deficits, address strength deficits, analyze and address soft tissue restrictions and analyze and cue movement patterns to attain remaining goals.    Progress toward goals / Updated goals:    See PN     PLAN  [x]  Upgrade activities as tolerated YES Continue plan of care   []  Discharge due to :    []  Other:      Therapist: Valery Gonsalez DPT     Date: 9/29/2021 Time: 7:38 PM        Future Appointments   Date Time Provider Candido Mccarty   10/13/2021  6:00 PM Geno Loron [facility]

## 2021-09-29 NOTE — PROGRESS NOTES
[facility] PHYSICAL THERAPY AT [address] [facility], [address] [place] [address], [address], [address] [place] [address]  Phone: [phone]  Fax: [phone]  PROGRESS NOTE  Patient Name: Corinne Bella : 1990   Treatment/Medical Diagnosis: Pain in right knee [M25.561]   Referral Source: [facility]     Date of Initial Visit: 2021 Attended Visits: 3 Missed Visits: 1     SUMMARY OF TREATMENT  PT consisted of manual therapy techniques, therapeutic exercises, and modalities to improve strength, improve mobility, decrease pain, and improve overall function. CURRENT STATUS  Pt is s/p R MCL on 2021 while playing volleyball. Pt has been seen 3 times including the initial evaluation. Pt has shown significant progress since last vist on 2021. Pt has AROM flexion: 148deg, AROM ext: -2deg. Pt has been performing her HEP regularly. Pt showing good ability to perform double leg jumping, single leg jumping, and running with good valgus control with the brace on. Pt also able to perform double leg jumping and agility ladder drills with no brace with no increase in valgus stress. Pt does demonstrate increase on initiation of jump but shows in contralateral limb as well. Could benefit from continued hip strengthening to improve dynamic control of the knee. Goal/Measure of Progress Goal Met? 1. Increase score on FOTO to > or = to 85 points to demo an increase in functional activity tolerance with the LE. Status at last Eval: 54 Current Status: 77 Progressing   2. Pt will note < or = 2/10 pain with all mobility to improve comfort with ADLs   Status at last Eval: 8 Current Status: 0 yes   3. Pt will demonstrate a GROC score of >/= +5 to show overall improvement in function   Status at last Eval: NA Current Status: +7 yes   4. Pt will be able to run and perform lateral movements with no pain in order to return to sport.    Status at last Eval: unable Current Status: With brace Progressing      New Goals to be achieved in __1__  weeks:  1. Continue goals above   2.  -   3.  -   4.  -       RECOMMENDATIONS  Pt to have one more visit in 2 weeks after continuing strengthening and jump program at home in preparation for discharge. If you have any questions/comments please contact us directly at [phone]. Thank you for allowing us to assist in the care of your patient. Therapist Signature: Roselyn Carlin Date: 9/29/2021     Time: 7:37 PM   NOTE TO PHYSICIAN:  PLEASE COMPLETE THE ORDERS BELOW AND FAX TO   [facility] at [email or web address]: [phone]. If you are unable to process this request in 24 hours please contact our office: [phone].    ___ I have read the above report and request that my patient continue as recommended.   ___ I have read the above report and request that my patient continue therapy with the following changes/special instructions:_________________________________________________________   ___ I have read the above report and request that my patient be discharged from therapy.      Physician Signature:        Date:       Time:        Su Travis

## 2021-10-13 ENCOUNTER — HOSPITAL ENCOUNTER (OUTPATIENT)
Dept: PHYSICAL THERAPY | Age: 31
Discharge: HOME | End: 2021-10-13
Payer: COMMERCIAL

## 2021-10-13 PROCEDURE — 97530 THERAPEUTIC ACTIVITIES: CPT

## 2021-10-13 PROCEDURE — 97110 THERAPEUTIC EXERCISES: CPT

## 2021-10-13 PROCEDURE — 97112 NEUROMUSCULAR REEDUCATION: CPT

## 2021-10-13 NOTE — PROGRESS NOTES
[facility] PHYSICAL THERAPY AT [facility] [address]  Phone: [phone]  Fax: [phone] SUMMARY  Patient Name: Grady Amaya : 1990   Treatment/Medical Diagnosis: Pain in right knee [M25.561]   Referral Source: Leodanjuan cdemetrio Habermann, Thaddeus Rivera [place]     Date of Initial Visit: 2021 Attended Visits: 4 Missed Visits: -     SUMMARY OF TREATMENT  PT consisted of manual therapy techniques, therapeutic exercises, and modalities to improve strength, improve mobility, decrease pain, and improve overall function. CURRENT STATUS  Pt showed great overall improvement since onset of care. Pt has full ROM and strength. Pt was able to complete all running, double leg jumping, single leg jumping, and lateral movements return to play tasks within 90% of other LE. Patient educated to use brace during competition for the next couple months until she feels comfortable to play without it. Pt cleared to return to sport. Goal/Measure of Progress Goal Met? 1. Increase score on FOTO to > or = to 85 points to demo an increase in functional activity tolerance with the LE. Status at last Eval: 54 Current Status: 99 yes   2. Pt will note < or = 2/10 pain with all mobility to improve comfort with ADLs   Status at last Eval: 8 Current Status: 0 yes   3. Pt will demonstrate a GROC score of >/= +5 to show overall improvement in function   Status at last Eval: NA Current Status: +7 yes   4. Pt will be able to run and perform lateral movements with no pain in order to return to sport. Status at last Eval: unable Current Status: able yes     RECOMMENDATIONS  Discontinue therapy. Progressing towards or have reached established goals. If you have any questions/comments please contact us directly at [phone]. Thank you for allowing us to assist in the care of your patient.     Therapist Signature: Ganesh Cortez Date: 10/14/2021     Time: 6:36 PM

## 2021-10-13 NOTE — PROGRESS NOTES
PHYSICAL THERAPY - DAILY TREATMENT NOTE    Patient Name: Easton Garcia        Date: 10/13/2021  : 1990    Patient  Verified: YES  Visit #:   4   of   4  Insurance: Payor: KIMO / Plan: Marquis Stein 74 / Product Type:  /      In time: 5:50 Out time: 6:30   Total Treatment Time: 40     Medicare Time Tracking (below)   Total Timed Codes (min):  - 1:1 Treatment Time:  -     TREATMENT AREA/ DIAGNOSIS = Pain in right knee [M25.561]    SUBJECTIVE   Pain Level (on 0 to 10 scale):  0  / 10   Medication Changes/New allergies or changes in medical history, any new surgeries or procedures?     NO    If yes, update Summary List   Subjective Functional Status/Changes:  []  No changes reported     See D/C note      OBJECTIVE    15 min Therapeutic Exercise:  [x]  See flow sheet   Rationale:      increase strength and improve coordination to improve the patients ability to perform ADLs without pain       15 min Therapeutic Activity: [x]  See flow sheet   Rationale:    functional activities to improve the patients ability to perform ADLs without pain    10 min Neuromuscular Re-ed: [x]  See flow sheet   Rationale:    improve balance and increase proprioception to improve the patients ability to perform ADLs without pain    Billed With/As:   [x] TE   [] TA   [] Neuro   [] Self Care Patient Education: [x] Review HEP    [] Progressed/Changed HEP based on:   [] positioning   [] body mechanics   [] transfers   [] heat/ice application    [] other:        Other Objective/Functional Measures:    See D/C summary   Post Treatment Pain Level (on 0 to 10) scale:   0  / 10     ASSESSMENT  Assessment/Changes in Function:     See D/C summary     []  See Progress Note/Recertification   Patient will continue to benefit from skilled PT services to modify and progress therapeutic interventions, address functional mobility deficits, address ROM deficits, address strength deficits, analyze and address soft tissue restrictions and analyze and cue movement patterns to attain remaining goals. Progress toward goals / Updated goals:    See D/C summary     PLAN  [x]  Upgrade activities as tolerated YES Continue plan of care   []  Discharge due to :    []  Other:      Therapist: Juni Alvarez DPT     Date: 10/13/2021 Time: 6:37 PM        No future appointments.

## 2022-03-21 NOTE — PATIENT DISCUSSION
Recommended artificial tears to use: 1 drop 4x a day in both eyes. -  DVT prophylaxis with SCDs due to thrombocytopenia  -  GI prophylaxis with Protonix

## 2022-04-02 ASSESSMENT — TONOMETRY
OD_IOP_MMHG: 13
OD_IOP_MMHG: 13
OS_IOP_MMHG: 14
OS_IOP_MMHG: 13

## 2022-04-02 ASSESSMENT — VISUAL ACUITY
OS_SC: 20/40-1
OD_SC: 20/20
OS_CC: 20/50
OD_SC: 20/20

## 2022-12-15 NOTE — PROGRESS NOTES
[facility]YS[facility]ERAP[facility]  [address] [place] [address],[facility], [facility] - Phone: [phone]  Fax: [phone]  PROGRESS NOTE  Patient Name: Keli Duarte : 1990   Treatment/Medical Diagnosis: Left knee pain [M25.562]   Referral Source: Jourdan Vieira,*     Date of Initial Visit: 17 Attended Visits: 27 Missed Visits: 0     SUMMARY OF TREATMENT  Pt has attended 27 sessions of PT s/p L ACLR. PT tx has consisted of therex, manual tx, plyometric training, modalities, and HEP in order to improve L knee ROM, strength/stability, balance/proprioception, and dec pain in order to enable pt return to sport. CURRENT STATUS  Pt recently returned to PT from 3 week absence due to having undergone recent unrelated medical procedure. As a result, pt has not been able to engage in any plyometric, running, or weighted activity, and has been performing body weight lower body workouts only. Pt under current restrictions until 6 weeks post-op, at which point she is clear to resume return to sport training. No significant changes in current objective status due to inability to progress. Demo's no significant loss in quad strength/stability nor ROM of the L knee. Goal/Measure of Progress Goal Met? 1. Pt will return to running   Status at last Eval: Linear run 30\" on/ 30\" off w/ good form Current Status: Unable to run in the last 3 weeks; not assessed; will resume at 6 weeks post op (~) n/a   2. Pt will be I with high level hep in order to prepare for dc    Status at last Eval: Independent w/ gym based workout plan Current Status: Goal ongoing n/a     New Goals to be achieved in __4-6  weeks:  Goals ongoing per above    RECOMMENDATIONS  Plan to continue PT 1x/wk for additional 4-6 weeks in order to progress running/plyometric/cutting activity in order to enable pt return to sport. Thank you.      If you have any questions/comments please contact us directly at [phone]. Thank you for allowing us to assist in the care of your patient. Therapist Signature: Deandra Clay PT Date: 4/25/2018     Time: 6:37 PM   NOTE TO PHYSICIAN:  PLEASE COMPLETE THE ORDERS BELOW AND FAX TO   [facility]: [phone]  If you are unable to process this request in 24 hours please contact our office: [phone]    ___ I have read the above report and request that my patient continue as recommended.   ___ I have read the above report and request that my patient continue therapy with the following changes/special instructions:_________________________________________________________   ___ I have read the above report and request that my patient be discharged from therapy.      Physician Signature:        Date:       Time: Xray Chest 1 View- PORTABLE-Urgent

## (undated) DEVICE — OCCLUSIVE GAUZE STRIP,3% BISMUTH TRIBROMOPHENATE IN PETROLATUM BLEND: Brand: XEROFORM

## (undated) DEVICE — FLEX ADVANTAGE 3000CC: Brand: FLEX ADVANTAGE

## (undated) DEVICE — (D)STRIP SKN CLSR 0.5X4IN WHT --

## (undated) DEVICE — HEX-LOCKING BLADE ELECTRODE: Brand: EDGE

## (undated) DEVICE — CAP END TIB NAIL S STL

## (undated) DEVICE — 3M™ BAIR PAWS FLEX™ WARMING GOWN, STANDARD, 20 PER CASE 81003: Brand: BAIR PAWS™

## (undated) DEVICE — TAPE UMB 1/8X30IN MP COT WHT --

## (undated) DEVICE — DRAPE TWL SURG 16X26IN BLU ORB04] ALLCARE INC]

## (undated) DEVICE — SET FLD MGMT TB ARTHRO IRRIG ASPIR INFLO DISP PMP CASS

## (undated) DEVICE — 3M™ STERI-STRIP™ COMPOUND BENZOIN TINCTURE 40 BAGS/CARTON 4 CARTONS/CASE C1544: Brand: 3M™ STERI-STRIP™

## (undated) DEVICE — [ARTHROSCOPY PUMP,  DO NOT USE IF PACKAGE IS DAMAGED,  KEEP DRY,  KEEP AWAY FROM SUNLIGHT,  PROTECT FROM HEAT AND RADIOACTIVE SOURCES.]: Brand: FLOSTEADY

## (undated) DEVICE — NEEDLE HYPO 22GA L1.5IN BLK S STL HUB POLYPR SHLD REG BVL

## (undated) DEVICE — SUTURE TAPE

## (undated) DEVICE — SOLUTION IRRIG 3000ML 0.9% SOD CHL FLX CONT 0797208] ICU MEDICAL INC]

## (undated) DEVICE — (D)GLOVE SURG ORTH 8 PWD LTX -- DISC BY MFR USE ITEM 278015

## (undated) DEVICE — NEEDLE SPNL 22GA L3.5IN BLK HUB S STL REG WALL FIT STYL W/

## (undated) DEVICE — SUTURE MCRYL SZ 4-0 L18IN ABSRB UD L19MM PS-2 3/8 CIR PRIM Y496G

## (undated) DEVICE — (D)SYR 10ML 1/5ML GRAD NSAF -- PKGING CHANGE USE ITEM 338027

## (undated) DEVICE — 3M™ IOBAN™ 2 ANTIMICROBIAL INCISE DRAPE 6650EZ: Brand: IOBAN™ 2

## (undated) DEVICE — Device

## (undated) DEVICE — SOFT SILICONE HYDROCELLULAR SACRUM DRESSING WITH LOCK AWAY LAYER: Brand: ALLEVYN LIFE SACRUM (LARGE) PACK OF 10

## (undated) DEVICE — REM POLYHESIVE ADULT PATIENT RETURN ELECTRODE: Brand: VALLEYLAB

## (undated) DEVICE — SUTURE TIGERSTICK TIGERWIRE SZ 2-0 L50IN NONABSORBABLE AR7209T

## (undated) DEVICE — SUTURE FIBERWIRE SZ 2 W/ TAPERED NEEDLE BLUE L38IN NONABSORB BLU L26.5MM 1/2 CIRCLE AR7200

## (undated) DEVICE — SUTURE ABSORBABLE BRAIDED 2-0 CT-1 27 IN UD VICRYL J259H

## (undated) DEVICE — DISPOSABLE TOURNIQUET CUFF SINGLE BLADDER, SINGLE PORT AND QUICK CONNECT CONNECTOR: Brand: COLOR CUFF

## (undated) DEVICE — (D)GLOVE SURG TRIFLX 7.5 PWD -- DISC BY MFR USE ITEM 102005

## (undated) DEVICE — KNEE BRACE: Brand: DEROYAL

## (undated) DEVICE — DRAPE,ARTHRO,W/POUCH,STERILE: Brand: MEDLINE

## (undated) DEVICE — (D)PACK ICE DISP -- DISC BY MFR

## (undated) DEVICE — SUTURE FIBERWIRE FIBERSTICK SZ 2-0 L50/12IN NONABSORBABLE AR7209

## (undated) DEVICE — KENDALL SCD EXPRESS SLEEVES, KNEE LENGTH, MEDIUM: Brand: KENDALL SCD

## (undated) DEVICE — INTENDED FOR TISSUE SEPARATION, AND OTHER PROCEDURES THAT REQUIRE A SHARP SURGICAL BLADE TO PUNCTURE OR CUT.: Brand: BARD-PARKER SAFETY BLADES SIZE 11, STERILE

## (undated) DEVICE — PREP SKN CHLRAPRP APPL 10.5ML --

## (undated) DEVICE — PENROSE TUBING RADIOPAQUE: Brand: ARGYLE

## (undated) DEVICE — SUTURE FIBERLINK SZ 2-0 L26IN NONABSORBABLE BLU CLS LOOP AR7235

## (undated) DEVICE — SPLINT ORTH AD L24IN FOR 29IN THGH UNIV KNEE FOAM